# Patient Record
Sex: FEMALE | Race: BLACK OR AFRICAN AMERICAN | NOT HISPANIC OR LATINO | Employment: FULL TIME | URBAN - METROPOLITAN AREA
[De-identification: names, ages, dates, MRNs, and addresses within clinical notes are randomized per-mention and may not be internally consistent; named-entity substitution may affect disease eponyms.]

---

## 2021-04-16 ENCOUNTER — PATIENT MESSAGE (OUTPATIENT)
Dept: RESEARCH | Facility: HOSPITAL | Age: 31
End: 2021-04-16

## 2021-07-01 ENCOUNTER — PATIENT MESSAGE (OUTPATIENT)
Dept: ADMINISTRATIVE | Facility: OTHER | Age: 31
End: 2021-07-01

## 2023-04-19 ENCOUNTER — PATIENT MESSAGE (OUTPATIENT)
Dept: RESEARCH | Facility: HOSPITAL | Age: 33
End: 2023-04-19
Payer: MEDICAID

## 2023-07-28 PROBLEM — Z21 HIV INFECTION: Status: ACTIVE | Noted: 2023-07-28

## 2023-07-28 PROBLEM — R53.1 WEAKNESS: Status: ACTIVE | Noted: 2023-07-28

## 2023-07-28 PROBLEM — B20 HIV INFECTION: Status: ACTIVE | Noted: 2023-07-28

## 2023-07-28 PROBLEM — N30.01 ACUTE CYSTITIS WITH HEMATURIA: Status: ACTIVE | Noted: 2023-07-28

## 2023-10-06 PROBLEM — R53.1 WEAKNESS: Status: RESOLVED | Noted: 2023-07-28 | Resolved: 2023-10-06

## 2023-10-06 PROBLEM — N30.01 ACUTE CYSTITIS WITH HEMATURIA: Status: RESOLVED | Noted: 2023-07-28 | Resolved: 2023-10-06

## 2023-10-06 PROBLEM — R56.9 SEIZURE: Chronic | Status: ACTIVE | Noted: 2023-10-06

## 2023-10-06 PROBLEM — G44.009 HEADACHES, CLUSTER: Chronic | Status: ACTIVE | Noted: 2023-10-06

## 2024-10-08 PROBLEM — E66.811 OBESITY, CLASS I, BMI 30-34.9: Chronic | Status: ACTIVE | Noted: 2024-10-08

## 2025-01-22 ENCOUNTER — PATIENT MESSAGE (OUTPATIENT)
Dept: OBSTETRICS AND GYNECOLOGY | Facility: CLINIC | Age: 35
End: 2025-01-22
Payer: MEDICAID

## 2025-03-06 ENCOUNTER — PATIENT MESSAGE (OUTPATIENT)
Dept: OBSTETRICS AND GYNECOLOGY | Facility: CLINIC | Age: 35
End: 2025-03-06

## 2025-03-06 ENCOUNTER — CLINICAL SUPPORT (OUTPATIENT)
Dept: OBSTETRICS AND GYNECOLOGY | Facility: CLINIC | Age: 35
End: 2025-03-06
Payer: MEDICAID

## 2025-03-06 DIAGNOSIS — N91.2 AMENORRHEA: Primary | ICD-10-CM

## 2025-03-06 PROCEDURE — 99999 PR PBB SHADOW E&M-EST. PATIENT-LVL II: CPT | Mod: PBBFAC,,,

## 2025-03-06 PROCEDURE — 99212 OFFICE O/P EST SF 10 MIN: CPT | Mod: PBBFAC

## 2025-03-06 NOTE — PROGRESS NOTES
Spoke with patient for a total of 20 minutes during virtual visit. Pt logged on late. Updated chart to reflect up to date patient demographics.  Allergies, medications, pharmacy, medical/family history and OB history updated.  Patient was guided through expectations of care during pregnancy.  Pregnancy confirmation & first routine OB appts scheduled. Will reach out to [facility] to get anatomy scan scheduled.   Education provided & questions answered. Encouraged to send message or call office with any questions/concerns. Verbalized understanding.     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

## 2025-03-07 ENCOUNTER — TELEPHONE (OUTPATIENT)
Dept: OBSTETRICS AND GYNECOLOGY | Facility: CLINIC | Age: 35
End: 2025-03-07
Payer: MEDICAID

## 2025-03-08 NOTE — TELEPHONE ENCOUNTER
Received message from Dr Ferro's office. Placed call & spoke with. Rescheduled preg confirm appt with NP at [facility] as requested by staff. Appt at convenient time for pt. Also made sure pt is aware of u/s on 3/14 at 8am. Questions answered. Verbalized understanding.

## 2025-03-14 ENCOUNTER — PROCEDURE VISIT (OUTPATIENT)
Dept: MATERNAL FETAL MEDICINE | Facility: CLINIC | Age: 35
End: 2025-03-14
Payer: MEDICAID

## 2025-03-14 DIAGNOSIS — Z36.2 ENCOUNTER FOR FOLLOW-UP ULTRASOUND OF FETAL ANATOMY: Primary | ICD-10-CM

## 2025-03-14 DIAGNOSIS — N91.2 AMENORRHEA: ICD-10-CM

## 2025-03-14 PROCEDURE — 76811 OB US DETAILED SNGL FETUS: CPT | Mod: PBBFAC | Performed by: OBSTETRICS & GYNECOLOGY

## 2025-03-18 ENCOUNTER — OFFICE VISIT (OUTPATIENT)
Dept: OBSTETRICS AND GYNECOLOGY | Facility: CLINIC | Age: 35
End: 2025-03-18
Payer: MEDICAID

## 2025-03-18 VITALS
BODY MASS INDEX: 40.22 KG/M2 | WEIGHT: 235.56 LBS | SYSTOLIC BLOOD PRESSURE: 116 MMHG | HEART RATE: 89 BPM | HEIGHT: 64 IN | DIASTOLIC BLOOD PRESSURE: 74 MMHG

## 2025-03-18 DIAGNOSIS — N91.2 AMENORRHEA: Primary | ICD-10-CM

## 2025-03-18 DIAGNOSIS — O09.92 HIGH-RISK PREGNANCY IN SECOND TRIMESTER: ICD-10-CM

## 2025-03-18 DIAGNOSIS — O98.712 HIV DISEASE AFFECTING PREGNANCY IN SECOND TRIMESTER: ICD-10-CM

## 2025-03-18 DIAGNOSIS — Z32.01 POSITIVE PREGNANCY TEST: ICD-10-CM

## 2025-03-18 DIAGNOSIS — Z3A.18 18 WEEKS GESTATION OF PREGNANCY: ICD-10-CM

## 2025-03-18 PROCEDURE — 99204 OFFICE O/P NEW MOD 45 MIN: CPT | Mod: TH,S$PBB,,

## 2025-03-18 PROCEDURE — 87077 CULTURE AEROBIC IDENTIFY: CPT

## 2025-03-18 PROCEDURE — 3074F SYST BP LT 130 MM HG: CPT | Mod: CPTII,,,

## 2025-03-18 PROCEDURE — 87186 SC STD MICRODIL/AGAR DIL: CPT

## 2025-03-18 PROCEDURE — 87088 URINE BACTERIA CULTURE: CPT

## 2025-03-18 PROCEDURE — 3078F DIAST BP <80 MM HG: CPT | Mod: CPTII,,,

## 2025-03-18 PROCEDURE — 99213 OFFICE O/P EST LOW 20 MIN: CPT | Mod: PBBFAC,TH,PN

## 2025-03-18 PROCEDURE — 87086 URINE CULTURE/COLONY COUNT: CPT

## 2025-03-18 PROCEDURE — 87591 N.GONORRHOEAE DNA AMP PROB: CPT

## 2025-03-18 PROCEDURE — 1160F RVW MEDS BY RX/DR IN RCRD: CPT | Mod: CPTII,,,

## 2025-03-18 PROCEDURE — 1159F MED LIST DOCD IN RCRD: CPT | Mod: CPTII,,,

## 2025-03-18 PROCEDURE — 99999 PR PBB SHADOW E&M-EST. PATIENT-LVL III: CPT | Mod: PBBFAC,,,

## 2025-03-18 RX ORDER — ONDANSETRON 4 MG/1
4 TABLET, ORALLY DISINTEGRATING ORAL EVERY 8 HOURS PRN
Qty: 30 TABLET | Refills: 0 | Status: SHIPPED | OUTPATIENT
Start: 2025-03-18

## 2025-03-18 NOTE — PROGRESS NOTES
CC: Amenorrhea / Positive Pregnancy Test - Pregnancy Confirmation Visit     HPI:  Yvrose Dye is a 34 y.o. female, ,  No LMP recorded (lmp unknown). Patient is pregnant.  for a routine exam complaining of amenorrhea and positive home UPT. This is the extent of the patient's complaints at this time. Reports she is having nausea and vomiting occasionally when she takes her ART medication and why she has been non-compliant. But daily N/V has improved. Had fetal anatomy scan on 3/14/2025 - need f/u and THAIS changed to 2025. Reports she is taking her prenatal vitamin daily.     Past Medical History:   Diagnosis Date    Human immunodeficiency virus (HIV) disease     Seizures     not had one for 12 years PTA     History reviewed. No pertinent surgical history.    MEDICATIONS AND ALLERGIES:  Current Medications[1]    Review of patient's allergies indicates:  No Known Allergies    No family history on file.    Social History[2]    COMPREHENSIVE GYN HISTORY:  PAP History: Denies abnormal Paps.  Infection History: Denies STDs. Denies PID.  Benign History: Denies uterine fibroids. Denies ovarian cysts. Denies endometriosis. Denies other conditions.  Cancer History: Denies cervical cancer. Denies uterine cancer or hyperplasia. Denies ovarian cancer. Denies vulvar cancer or pre-cancer. Denies vaginal cancer or pre-cancer. Denies breast cancer. Denies colon cancer.  Sexual Activity History: Reports currently being sexually active  Menstrual History: None.  Contraception: None    ROS:  GENERAL: No weight changes. No swelling. No fatigue. No fever.  CARDIOVASCULAR: No chest pain. No shortness of breath. No leg cramps.   NEUROLOGICAL: No headaches. No vision changes.  BREASTS: No pain. No lumps. No discharge.  ABDOMEN: No pain. No nausea. No vomiting. No diarrhea. No constipation.  REPRODUCTIVE: No abnormal bleeding. No pelvic pain  VULVA: No pain. No lesions. No itching.  VAGINA: No relaxation. No itching. No odor.  "No discharge. No lesions.  URINARY: No incontinence. No nocturia. No frequency. No dysuria.    /74 (BP Location: Left arm, Patient Position: Sitting)   Pulse 89   Ht 5' 4" (1.626 m)   Wt 106.8 kg (235 lb 9 oz)   LMP  (LMP Unknown) Comment: pt has nexplanon  Breastfeeding No   BMI 40.43 kg/m²     PE:  AFFECT: Calm, alert and oriented x3. Interactive during exam.  GENERAL: Appears well-nourished, well-developed, in no acute distress.  HEAD: Normocephalic, atraumatic.  THYROID: No thyromegaly.    SKIN: Normal for race, warm, & dry. No lesions or rashes.  LUNGS: Easy and unlabored.  HEART: Regular rate.  EXTREMITIES: No cyanosis, clubbing or edema. No calf tenderness.  LYMPH NODES: No axillary or inguinal adenopathy.      PROCEDURES:  UPT Positive  Fetal anatomy scan done 3/14/25. AGA THAIS updated in [facility]. F/u Anatomy needed and already scheduled with MFM.     DIAGNOSIS:  Gyn exam  IUP with stated LMP of No LMP recorded (lmp unknown). Patient is pregnant.    PLAN:Routine prenatal care. MFM consult for high-risk pregnancy (+HIV w/ art Rx). Connected Mom ordered.    MEDICATIONS PRESCRIBED:  PNV    LABS AND TESTS ORDERED:  New Ob Labs (1st trimester labs)  HIV viral load ordered.  (Wants labs done at [facility] only or at outside facility - not [facility]).    1st TRIMESTER COUNSELING: Discussed all, booklet provided  Common complaints of pregnancy  HIV and other routine prenatal tests including  genetic screening  Risk factors identified by prenatal history  Anticipated course of prenatal care  Nutrition and weight gain counseling  Toxoplasmosis precautions (Cats/Raw Meat)  Sexual activity and exercise  Environmental/Work hazards  Travel  Tobacco (Ask, Advise, Assess, Assist, and Arrange), as well as alcohol and drug use  Use of any medications (Including supplements, Vitamins, Herbs, or OTC Drugs)  Indications for Ultrasound  Domestic violence  Seat belt use  Childbirth classes/Hospital facilities "     TERATOLOGY COUNSELING: Discussed options for SS, MT21 and carrier screening. Pt will let us know her desires. Discussed time constraints on SS.   - Wants Maternit 21 (ordered).    FOLLOW-UP  - Schedule procedure visit later this week for Nexplanon removal   - Maternal fetal medicine (MFM) initial appointment scheduled for (4/2/2025) - high risk pregnancy / + HIV ART Rx  - New Ob Visit in 4 weeks with Dr. Ferro (4/9/2025)  - Follow-up anatomy scan ordered & scheduled for 4/14/2025  - Discussed to call clinic or L&D/ER if after hours for pain/bleeding.      Macy Cope, NP-C [facility]          [1]   Current Outpatient Medications:     dolutegravir-lamivudine (DOVATO)  mg Tab, Take 1 tablet by mouth once daily., Disp: 30 tablet, Rfl: 11    etonogestreL (NEXPLANON) 68 mg Impl subdermal device, 68 mg by Subdermal route once. A single NEXPLANON implant is inserted subdermally just under the skin at the inner side of the non-dominant upper arm., Disp: , Rfl:     ondansetron (ZOFRAN-ODT) 4 MG TbDL, Take 1 tablet (4 mg total) by mouth every 8 (eight) hours as needed., Disp: 30 tablet, Rfl: 0  [2]   Social History  Socioeconomic History    Marital status: Significant Other   Tobacco Use    Smoking status: Never    Smokeless tobacco: Never   Substance and Sexual Activity    Alcohol use: Not Currently    Drug use: Not Currently     Types: Marijuana     Comment: last used 12/2024    Sexual activity: Yes     Partners: Male

## 2025-03-19 ENCOUNTER — RESULTS FOLLOW-UP (OUTPATIENT)
Dept: OBSTETRICS AND GYNECOLOGY | Facility: CLINIC | Age: 35
End: 2025-03-19

## 2025-03-19 LAB
C TRACH DNA SPEC QL NAA+PROBE: NOT DETECTED
N GONORRHOEA DNA SPEC QL NAA+PROBE: NOT DETECTED

## 2025-03-21 LAB — BACTERIA UR CULT: ABNORMAL

## 2025-03-24 ENCOUNTER — ROUTINE PRENATAL (OUTPATIENT)
Dept: OBSTETRICS AND GYNECOLOGY | Facility: CLINIC | Age: 35
End: 2025-03-24
Payer: MEDICAID

## 2025-03-24 VITALS
BODY MASS INDEX: 41.08 KG/M2 | HEART RATE: 77 BPM | SYSTOLIC BLOOD PRESSURE: 114 MMHG | WEIGHT: 239.31 LBS | DIASTOLIC BLOOD PRESSURE: 72 MMHG

## 2025-03-24 DIAGNOSIS — O09.92 HIGH-RISK PREGNANCY IN SECOND TRIMESTER: ICD-10-CM

## 2025-03-24 DIAGNOSIS — R10.2 PELVIC PAIN AFFECTING PREGNANCY IN SECOND TRIMESTER, ANTEPARTUM: ICD-10-CM

## 2025-03-24 DIAGNOSIS — O98.712 HIV DISEASE AFFECTING PREGNANCY IN SECOND TRIMESTER: ICD-10-CM

## 2025-03-24 DIAGNOSIS — O26.892 PELVIC PAIN AFFECTING PREGNANCY IN SECOND TRIMESTER, ANTEPARTUM: ICD-10-CM

## 2025-03-24 DIAGNOSIS — Z3A.19 19 WEEKS GESTATION OF PREGNANCY: Primary | ICD-10-CM

## 2025-03-24 PROCEDURE — 81515 NFCT DS BV&VAGINITIS DNA ALG: CPT

## 2025-03-24 PROCEDURE — 99999 PR PBB SHADOW E&M-EST. PATIENT-LVL II: CPT | Mod: PBBFAC,,,

## 2025-03-24 PROCEDURE — 87086 URINE CULTURE/COLONY COUNT: CPT

## 2025-03-24 PROCEDURE — 99212 OFFICE O/P EST SF 10 MIN: CPT | Mod: PBBFAC,TH,PN

## 2025-03-24 PROCEDURE — 81003 URINALYSIS AUTO W/O SCOPE: CPT

## 2025-03-24 PROCEDURE — 99214 OFFICE O/P EST MOD 30 MIN: CPT | Mod: S$PBB,TH,,

## 2025-03-24 NOTE — PROGRESS NOTES
34 y.o. at 19w1d here for constant pelvic pain (8/10) that feels like pressure and aching in pelvis but also wraps around to lower back bilaterally for last 3 days. Reports HA also but rest helped relieve that pain. Does find activity worsens pelvic pain and sitting or laying down helps for a little while but pressure/pain does return once moving around again. Denies n/v, diarrhea, or being in contact with any who was ill. 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 Has not tried tylenol for pain but has some at home she will try today.   Denies flank pain, fever, N/V, chills, or dysuria.   Denies VB/LOF.   Has not felt baby move just yet during pregnancy.     A/P:  - Increase water intake 6-8 16 ounce water bottles per day.  - UA w/ culture repeated.  - POCT UA WNL  - Vaginosis swab collected.  - Discussed causes of pelvic pain in 2 nd trimester - round ligament pain more common earlier on in subsequent pregnancies.   - Discussed importance of resting when pelvic pain starts and taking tylenol PRN but belly band the best way to help improve round ligament .   - MFM anatomy US on 2025    RTC for Dashawn Ferro on 2025     Macy Cope, NP-C [facility]

## 2025-03-25 LAB
BACTERIA #/AREA URNS AUTO: ABNORMAL /HPF
BACTERIAL VAGINOSIS DNA (OHS): NOT DETECTED
BILIRUB UR QL STRIP.AUTO: NEGATIVE
CANDIDA GLABRATA/KRUSEI DNA (OHS): NOT DETECTED
CANDIDA SPECIES DNA (OHS): NOT DETECTED
CLARITY UR: ABNORMAL
COLOR UR AUTO: YELLOW
GLUCOSE UR QL STRIP: NEGATIVE
HGB UR QL STRIP: ABNORMAL
KETONES UR QL STRIP: NEGATIVE
LEUKOCYTE ESTERASE UR QL STRIP: NEGATIVE
MICROSCOPIC COMMENT: ABNORMAL
NITRITE UR QL STRIP: POSITIVE
PH UR STRIP: 7 [PH]
PROT UR QL STRIP: ABNORMAL
RBC #/AREA URNS AUTO: 8 /HPF (ref 0–4)
SP GR UR STRIP: 1.02
SQUAMOUS #/AREA URNS AUTO: 3 /HPF
TRICHOMONAS VAGINALIS DNA (OHS): NOT DETECTED
UROBILINOGEN UR STRIP-ACNC: NEGATIVE EU/DL
WBC #/AREA URNS AUTO: 3 /HPF (ref 0–5)
YEAST UR QL AUTO: ABNORMAL /HPF

## 2025-03-26 ENCOUNTER — RESULTS FOLLOW-UP (OUTPATIENT)
Dept: OBSTETRICS AND GYNECOLOGY | Facility: CLINIC | Age: 35
End: 2025-03-26

## 2025-03-26 ENCOUNTER — PATIENT MESSAGE (OUTPATIENT)
Dept: OBSTETRICS AND GYNECOLOGY | Facility: CLINIC | Age: 35
End: 2025-03-26
Payer: MEDICAID

## 2025-03-26 DIAGNOSIS — O23.42 UTI (URINARY TRACT INFECTION) DURING PREGNANCY, SECOND TRIMESTER: Primary | ICD-10-CM

## 2025-03-26 RX ORDER — AMOXICILLIN AND CLAVULANATE POTASSIUM 875; 125 MG/1; MG/1
1 TABLET, FILM COATED ORAL EVERY 12 HOURS
Qty: 14 TABLET | Refills: 0 | Status: SHIPPED | OUTPATIENT
Start: 2025-03-26 | End: 2025-04-02

## 2025-03-28 LAB — BACTERIA UR CULT: ABNORMAL

## 2025-03-30 ENCOUNTER — PATIENT MESSAGE (OUTPATIENT)
Dept: OTHER | Facility: OTHER | Age: 35
End: 2025-03-30
Payer: MEDICAID

## 2025-04-01 PROBLEM — O98.712 HIV DISEASE AFFECTING PREGNANCY IN SECOND TRIMESTER: Status: ACTIVE | Noted: 2025-04-01

## 2025-04-01 NOTE — ASSESSMENT & PLAN NOTE
Unfortunately today's counseling was somewhat limited due to inability to review any updated labs. In my discussion with Yvrose, she believes her dx was made around the end of 2024, though record suggest it was in 2023 during an ED visit prompted by generally feeling poorly. Available labs suggest HIV positive by rapid testing but I do not see any prior quantitative RNA or CD4 count. She does appear to have been started on HAART in the form of Dovato. When discussing compliance, she shares that she misses about 3 doses a week. Appears to have been referred to ID by primary care provider but I do not see that she has made it to a visit and she does not recall.     I counseled the patient regarding the risks of vertical transmission and encouraged compliance with combination antiretroviral therapy (cART) during pregnancy. We discussed the risks of vertical transmission as relates to patient's compliance with cART and viral load. We defer selection and continuation of cART therapy to the patient's infectious disease specialist as genotyping and baseline laboratory studies should be assessed prior to initiation. The site: https:hivinfo.nih.gov lists the up to date recommendations regarding efficacy and potential risks of medications in pregnancy.     Patient's regimen is Dovato: which is associated with an increased risk of congenital malformations. Dolutegravir as a component of this combined antiretroviral therapy has been associated with increased risk of NTDs.    Recommendations:  Referral to ID was placed today; defer baseline laboratory evaluation, prophylaxis for opportunistic infections, genotyping, and cART regimen to ID  Discussed importance of updated labs. Added a CD4 count to the labs already ordered and pending for her and sent her straight to lab following today's visit. Unfortunately as it is now end of day, it does not appear that she has had them drawn.   Ensure vaccinations are up to date: influenza,  hepatitis A, hepatitis B, pneumovax, and TDAP. If COVID vaccine deemed appropriate by ID, offer as able  Continue current regimen: Dovato - of note, there are some medications with better safety profiles in pregnancy (Efavirenz, Nevirapine, Tenofovir). No US was performed today but review of prior anatomic survey from 3/14, it should be noted that the spinal views could not be cleared and thus an NTD cannot be ruled out. At this gestational age, any potential effects on organogenesis would have already occurred. Follow up scan has been scheduled for 4/14 to complete anatomic survey.   Stressed importance of compliance for both decreasing risk of vertical transmission and decreasing risk of resistance in the strain   Health maintenance: pap smear, TB screen, and STI testing per ID or OB; repeat STI testing in 3rd trimester  Amniocentesis has previously been avoided in the past. If this is considered, it would be recommended to at least have an undetectable viral load before pursuing this.  Consider early GDM screening if patient on protease inhibitor and repeat at 24-28 weeks  Fetal growth ultrasound at 28 and 34 weeks to monitor for fetal growth restriction  Viral load (VL) monitoring:  Monitor monthly until undetectable  If VL undetectable, monitor every trimester, at 36 weeks, and upon admission to L&D  Monitor CD4 count every trimester if < 300  Delivery:  If VL is > 1000 copies/mL prior to 38 weeks', perform pre-labor C/S at 38 0/7 wga to reduce risk of vertical transmission - discussed this as a possibility. She has notably had 3 prior vaginal deliveries with pelvis proven to at least 7#. Encouraged compliance from standpoint of attempt to avoid major abdominal surgery as well.   If VL is <= 1000 copies/mL and patient is on cART, C/S is typically reserved for obstetric indications only and delivery can take place at or beyond 39 weeks gestation (unless an indication arises earlier)  Avoid use of fetal scalp  electrode  AROM and operative vaginal delivery per obstetric indication, but avoid if possible if viral load >= 50 copies/mL  Continue cART regimen during L&D. [facility] recommends administration of intrapartum Zidovudine (ZDV) to all patients with HIV prior to  or during vaginal delivery, regardless of VL. Goal is to administer at least 3 hours of ZDV prior to delivery. Dose is 1-hour loading dose at 2 mg/kg followed by continuous infusion of 1 mg/kg/hr until delivery.  Avoid methergine with certain protease inhibitors.  Notify pediatrics so that appropriate postexposure prophylaxis and HIV testing is performed.  Patient counseled re: condom use; partner should be evaluated for PrEP.  Postpartum care should include contraceptive counseling (LARC should be considered), continued compliance with ID & cART, and monitoring for peripartum mood disorders. Patient should not breastfeed.  If patient has a viral load of >1000 copies and presents with ROM or labor, you may call [phone] for HIV expert to help develop an individualized delivery plan.

## 2025-04-02 ENCOUNTER — OFFICE VISIT (OUTPATIENT)
Dept: MATERNAL FETAL MEDICINE | Facility: CLINIC | Age: 35
End: 2025-04-02
Payer: MEDICAID

## 2025-04-02 VITALS
SYSTOLIC BLOOD PRESSURE: 117 MMHG | HEIGHT: 64 IN | DIASTOLIC BLOOD PRESSURE: 72 MMHG | BODY MASS INDEX: 40.98 KG/M2 | WEIGHT: 240.06 LBS

## 2025-04-02 DIAGNOSIS — O98.712 HIV DISEASE AFFECTING PREGNANCY IN SECOND TRIMESTER: Primary | ICD-10-CM

## 2025-04-02 PROCEDURE — 99213 OFFICE O/P EST LOW 20 MIN: CPT | Mod: PBBFAC,TH | Performed by: STUDENT IN AN ORGANIZED HEALTH CARE EDUCATION/TRAINING PROGRAM

## 2025-04-02 PROCEDURE — 99999 PR PBB SHADOW E&M-EST. PATIENT-LVL III: CPT | Mod: PBBFAC,,, | Performed by: STUDENT IN AN ORGANIZED HEALTH CARE EDUCATION/TRAINING PROGRAM

## 2025-04-02 NOTE — PROGRESS NOTES
MATERNAL-FETAL MEDICINE   CONSULT NOTE    Provider requesting consultation: Dr. Ferro    SUBJECTIVE:     Ms. Yvrose Dye is a 35 y.o.  female with IUP at 20w3d who is seen in consultation by MFM for evaluation and management of:  Problem   HIV Disease Affecting Pregnancy in Second Trimester         Medication List with Changes/Refills   Current Medications    AMOXICILLIN-CLAVULANATE 875-125MG (AUGMENTIN) 875-125 MG PER TABLET    Take 1 tablet by mouth every 12 (twelve) hours. for 7 days    DOLUTEGRAVIR-LAMIVUDINE (DOVATO)  MG TAB    Take 1 tablet by mouth once daily.    ETONOGESTREL (NEXPLANON) 68 MG IMPL SUBDERMAL DEVICE    68 mg by Subdermal route once. A single NEXPLANON implant is inserted subdermally just under the skin at the inner side of the non-dominant upper arm.    ONDANSETRON (ZOFRAN-ODT) 4 MG TBDL    Take 1 tablet (4 mg total) by mouth every 8 (eight) hours as needed.       Review of patient's allergies indicates:  No Known Allergies    PMH:  Past Medical History:   Diagnosis Date    Human immunodeficiency virus (HIV) disease     Seizures     not had one for 12 years PTA       PObHx:  OB History    Para Term  AB Living   4 3 3   3   SAB IAB Ectopic Multiple Live Births      1 3      # Outcome Date GA Lbr Aguila/2nd Weight Sex Type Anes PTL Lv   4A             4B Current            3 Term 07/02/15 40w0d  3.402 kg (7 lb 8 oz) F Vag-Spont  N NAHUM   2 Term 14 40w0d  2.863 kg (6 lb 5 oz) M Vag-Spont  N NAHUM   1 Term 06/15/10 40w0d  3.317 kg (7 lb 5 oz) F Vag-Spont  N NAHUM       PSH:History reviewed. No pertinent surgical history.    Family history:family history is not on file.    Social history: reports that she has never smoked. She has never used smokeless tobacco. She reports that she does not currently use alcohol. She reports that she does not currently use drugs after having used the following drugs: Marijuana.    Genetic history:  The patient denies any  "inherited genetic diseases or birth defects in herself or her partner's personal history or family.    Objective:   /72 (BP Location: Left arm, Patient Position: Sitting)   Ht 5' 4" (1.626 m)   Wt 108.9 kg (240 lb 1.3 oz)   LMP  (LMP Unknown) Comment: pt has nexplanon  BMI 41.21 kg/m²     Physical Exam  WNWD female appearing stated age, in NAD  No conversational dyspnea  Gravid abdomen  FHTs +    Significant labs/imaging:  No recent labs to review  Reviewed HIV + testing from , no available CD4 count    ASSESSMENT/PLAN:     35 y.o.  female with IUP at 20w3d     HIV disease affecting pregnancy in second trimester  Unfortunately today's counseling was somewhat limited due to inability to review any updated labs. In my discussion with Yvrose, she believes her dx was made around the end of , though record suggest it was in  during an ED visit prompted by generally feeling poorly. Available labs suggest HIV positive by rapid testing but I do not see any prior quantitative RNA or CD4 count. She does appear to have been started on HAART in the form of Dovato. When discussing compliance, she shares that she misses about 3 doses a week. Appears to have been referred to ID by primary care provider but I do not see that she has made it to a visit and she does not recall.     I counseled the patient regarding the risks of vertical transmission and encouraged compliance with combination antiretroviral therapy (cART) during pregnancy. We discussed the risks of vertical transmission as relates to patient's compliance with cART and viral load. We defer selection and continuation of cART therapy to the patient's infectious disease specialist as genotyping and baseline laboratory studies should be assessed prior to initiation. The site: https:hivinfo.nih.gov lists the up to date recommendations regarding efficacy and potential risks of medications in pregnancy.     Patient's regimen is Dovato: which is " associated with an increased risk of congenital malformations. Dolutegravir as a component of this combined antiretroviral therapy has been associated with increased risk of NTDs.    Recommendations:  Referral to ID was placed today; defer baseline laboratory evaluation, prophylaxis for opportunistic infections, genotyping, and cART regimen to ID  Discussed importance of updated labs. Added a CD4 count to the labs already ordered and pending for her and sent her straight to lab following today's visit. Unfortunately as it is now end of day, it does not appear that she has had them drawn.   Ensure vaccinations are up to date: influenza, hepatitis A, hepatitis B, pneumovax, and TDAP. If COVID vaccine deemed appropriate by ID, offer as able  Continue current regimen: Dovato - of note, there are some medications with better safety profiles in pregnancy (Efavirenz, Nevirapine, Tenofovir). No US was performed today but review of prior anatomic survey from 3/14, it should be noted that the spinal views could not be cleared and thus an NTD cannot be ruled out. At this gestational age, any potential effects on organogenesis would have already occurred. Follow up scan has been scheduled for 4/14 to complete anatomic survey.   Stressed importance of compliance for both decreasing risk of vertical transmission and decreasing risk of resistance in the strain   Health maintenance: pap smear, TB screen, and STI testing per ID or OB; repeat STI testing in 3rd trimester  Amniocentesis has previously been avoided in the past. If this is considered, it would be recommended to at least have an undetectable viral load before pursuing this.  Consider early GDM screening if patient on protease inhibitor and repeat at 24-28 weeks  Fetal growth ultrasound at 28 and 34 weeks to monitor for fetal growth restriction  Viral load (VL) monitoring:  Monitor monthly until undetectable  If VL undetectable, monitor every trimester, at 36 weeks, and  upon admission to L&D  Monitor CD4 count every trimester if < 300  Delivery:  If VL is > 1000 copies/mL prior to 38 weeks', perform pre-labor C/S at 38 0/7 wga to reduce risk of vertical transmission - discussed this as a possibility. She has notably had 3 prior vaginal deliveries with pelvis proven to at least 7#. Encouraged compliance from standpoint of attempt to avoid major abdominal surgery as well.   If VL is <= 1000 copies/mL and patient is on cART, C/S is typically reserved for obstetric indications only and delivery can take place at or beyond 39 weeks gestation (unless an indication arises earlier)  Avoid use of fetal scalp electrode  AROM and operative vaginal delivery per obstetric indication, but avoid if possible if viral load >= 50 copies/mL  Continue cART regimen during L&D. [facility] recommends administration of intrapartum Zidovudine (ZDV) to all patients with HIV prior to  or during vaginal delivery, regardless of VL. Goal is to administer at least 3 hours of ZDV prior to delivery. Dose is 1-hour loading dose at 2 mg/kg followed by continuous infusion of 1 mg/kg/hr until delivery.  Avoid methergine with certain protease inhibitors.  Notify pediatrics so that appropriate postexposure prophylaxis and HIV testing is performed.  Patient counseled re: condom use; partner should be evaluated for PrEP.  Postpartum care should include contraceptive counseling (LARC should be considered), continued compliance with ID & cART, and monitoring for peripartum mood disorders. Patient should not breastfeed.  If patient has a viral load of >1000 copies and presents with ROM or labor, you may call [phone] for HIV expert to help develop an individualized delivery plan.        FOLLOW UP:   F/u on  as previously scheduled for US and MFM consultation.       60 minutes of total time spent on the encounter, which includes face to face time and non-face to face time preparing to see the patient (eg,  review of tests), obtaining and/or reviewing separately obtained history, documenting clinical information in the electronic or other health record, independently interpreting results (not separately reported) and communicating results to the patient/family/caregiver, or care coordination (not separately reported).      Bronwyn Mei  Maternal-Fetal Medicine    Electronically Signed by Bronwyn Mei April 2, 2025

## 2025-04-09 ENCOUNTER — INITIAL PRENATAL (OUTPATIENT)
Dept: OBSTETRICS AND GYNECOLOGY | Facility: CLINIC | Age: 35
End: 2025-04-09
Payer: MEDICAID

## 2025-04-09 VITALS
BODY MASS INDEX: 41.17 KG/M2 | SYSTOLIC BLOOD PRESSURE: 115 MMHG | DIASTOLIC BLOOD PRESSURE: 69 MMHG | WEIGHT: 239.88 LBS

## 2025-04-09 DIAGNOSIS — Z30.46 NEXPLANON REMOVAL: ICD-10-CM

## 2025-04-09 DIAGNOSIS — O98.712 HIV DISEASE AFFECTING PREGNANCY IN SECOND TRIMESTER: ICD-10-CM

## 2025-04-09 DIAGNOSIS — O09.90 HIGH RISK PREGNANCY, ANTEPARTUM: Primary | ICD-10-CM

## 2025-04-09 PROCEDURE — 99999 PR PBB SHADOW E&M-EST. PATIENT-LVL II: CPT | Mod: PBBFAC,,, | Performed by: OBSTETRICS & GYNECOLOGY

## 2025-04-09 PROCEDURE — 99212 OFFICE O/P EST SF 10 MIN: CPT | Mod: PBBFAC,TH,PN | Performed by: OBSTETRICS & GYNECOLOGY

## 2025-04-09 PROCEDURE — 99214 OFFICE O/P EST MOD 30 MIN: CPT | Mod: 25,S$PBB,TH, | Performed by: OBSTETRICS & GYNECOLOGY

## 2025-04-09 PROCEDURE — 11982 REMOVE DRUG IMPLANT DEVICE: CPT | Mod: PBBFAC,PN | Performed by: OBSTETRICS & GYNECOLOGY

## 2025-04-09 NOTE — PROGRESS NOTES
34yo  at 21w3d presents for initial OB visit. HR OB due to HIV, on ART but misses doses occasionally - she does report she had been better about this.  Has not had labs drawn for quant and CD4 count.  Desires blood work to be drawn at [facility] - has US appt next week and states she will draw labs then (with new ob labs as well).  Overall doing well today, occ ligament pain, but no bleeding/spotting. +FM.  Taking PNV.   Of note, Nexplanon in place for 9 years.       A/P:  HR OB visit today.   Nexplanon removed today - see procedure note.   Discussed importance of labwork, including viral load and CD 4 count - will draw when at [facility] for US next week.   Continue f/u with MFM  Discussed importance of routine prenatal care.  OB ED precautions given.    Counseling done, precautions given, all questions answered.  RTC 3 wks for OB visit and glucola (will schedule at [facility] per pt request).     Shaila Ferro MD

## 2025-04-09 NOTE — PROCEDURES
Removal of Nexplanon Device    Date/Time: 4/9/2025 10:30 AM    Performed by: Shaila Ferro MD  Authorized by: Shaila Ferro MD    Consent obtained:  Prior to procedure the appropriate consent was completed and verified  Consent given by:  Patient  Procedure risks and benefits discussed: yes    Patient questions answered: yes    Patient agrees, verbalizes understanding, and wants to proceed: yes    Implant grasped by: hemostat  Removed with no complications: yes    Removal due to expiration: yes    Arm: left arm  Palpation confirms location: yes  Small stab incision was made in arm: yes  Upon removal device was intact: yes  Site was close with steri-strips and pressure bandage applied: yes  Prepped with:  povidone-iodine 7.5% surgical scrub  Local anesthetic:  Lidocaine 1%   The site was cleaned  and prepped in a sterile fashion: yes  Specimen sent to pathology: Yes

## 2025-04-14 ENCOUNTER — LAB VISIT (OUTPATIENT)
Dept: LAB | Facility: OTHER | Age: 35
End: 2025-04-14
Attending: NURSE PRACTITIONER
Payer: MEDICAID

## 2025-04-14 ENCOUNTER — PROCEDURE VISIT (OUTPATIENT)
Dept: MATERNAL FETAL MEDICINE | Facility: CLINIC | Age: 35
End: 2025-04-14
Payer: MEDICAID

## 2025-04-14 ENCOUNTER — TELEPHONE (OUTPATIENT)
Dept: MATERNAL FETAL MEDICINE | Facility: CLINIC | Age: 35
End: 2025-04-14
Payer: MEDICAID

## 2025-04-14 ENCOUNTER — OFFICE VISIT (OUTPATIENT)
Dept: MATERNAL FETAL MEDICINE | Facility: CLINIC | Age: 35
End: 2025-04-14
Attending: OBSTETRICS & GYNECOLOGY
Payer: MEDICAID

## 2025-04-14 VITALS
DIASTOLIC BLOOD PRESSURE: 85 MMHG | BODY MASS INDEX: 41.1 KG/M2 | HEIGHT: 64 IN | WEIGHT: 240.75 LBS | SYSTOLIC BLOOD PRESSURE: 135 MMHG

## 2025-04-14 DIAGNOSIS — O98.712 HIV DISEASE AFFECTING PREGNANCY IN SECOND TRIMESTER: ICD-10-CM

## 2025-04-14 DIAGNOSIS — Z36.89 ENCOUNTER FOR ULTRASOUND TO ASSESS FETAL GROWTH: ICD-10-CM

## 2025-04-14 DIAGNOSIS — Z36.2 ENCOUNTER FOR FOLLOW-UP ULTRASOUND OF FETAL ANATOMY: ICD-10-CM

## 2025-04-14 DIAGNOSIS — E66.811 OBESITY, CLASS I, BMI 30-34.9: Primary | Chronic | ICD-10-CM

## 2025-04-14 DIAGNOSIS — O09.90 HIGH RISK PREGNANCY, ANTEPARTUM: ICD-10-CM

## 2025-04-14 LAB
ABSOLUTE EOSINOPHIL (OHS): 0.04 K/UL
ABSOLUTE MONOCYTE (OHS): 0.48 K/UL (ref 0.3–1)
ABSOLUTE NEUTROPHIL COUNT (OHS): 3.74 K/UL (ref 1.8–7.7)
ALBUMIN SERPL BCP-MCNC: 2.9 G/DL (ref 3.5–5.2)
ALP SERPL-CCNC: 50 UNIT/L (ref 40–150)
ALT SERPL W/O P-5'-P-CCNC: 7 UNIT/L (ref 10–44)
ANION GAP (OHS): 6 MMOL/L (ref 8–16)
AST SERPL-CCNC: 12 UNIT/L (ref 11–45)
BASOPHILS # BLD AUTO: 0.01 K/UL
BASOPHILS NFR BLD AUTO: 0.2 %
BILIRUB SERPL-MCNC: 0.2 MG/DL (ref 0.1–1)
BUN SERPL-MCNC: 5 MG/DL (ref 6–20)
CALCIUM SERPL-MCNC: 8.6 MG/DL (ref 8.7–10.5)
CHLORIDE SERPL-SCNC: 110 MMOL/L (ref 95–110)
CO2 SERPL-SCNC: 20 MMOL/L (ref 23–29)
CREAT SERPL-MCNC: 0.6 MG/DL (ref 0.5–1.4)
ERYTHROCYTE [DISTWIDTH] IN BLOOD BY AUTOMATED COUNT: 15.4 % (ref 11.5–14.5)
GFR SERPLBLD CREATININE-BSD FMLA CKD-EPI: >60 ML/MIN/1.73/M2
GLUCOSE SERPL-MCNC: 88 MG/DL (ref 70–110)
HBV SURFACE AG SERPL QL IA: NORMAL
HCT VFR BLD AUTO: 28.7 % (ref 37–48.5)
HCV AB SERPL QL IA: NEGATIVE
HGB BLD-MCNC: 9 GM/DL (ref 12–16)
IMM GRANULOCYTES # BLD AUTO: 0.01 K/UL (ref 0–0.04)
IMM GRANULOCYTES NFR BLD AUTO: 0.2 % (ref 0–0.5)
INDIRECT COOMBS: NORMAL
LYMPHOCYTES # BLD AUTO: 1.09 K/UL (ref 1–4.8)
MCH RBC QN AUTO: 27 PG (ref 27–31)
MCHC RBC AUTO-ENTMCNC: 31.4 G/DL (ref 32–36)
MCV RBC AUTO: 86 FL (ref 82–98)
NUCLEATED RBC (/100WBC) (OHS): 0 /100 WBC
PLATELET # BLD AUTO: 225 K/UL (ref 150–450)
PMV BLD AUTO: 10.6 FL (ref 9.2–12.9)
POTASSIUM SERPL-SCNC: 3.7 MMOL/L (ref 3.5–5.1)
PROT SERPL-MCNC: 6.8 GM/DL (ref 6–8.4)
RBC # BLD AUTO: 3.33 M/UL (ref 4–5.4)
RELATIVE EOSINOPHIL (OHS): 0.7 %
RELATIVE LYMPHOCYTE (OHS): 20.3 % (ref 18–48)
RELATIVE MONOCYTE (OHS): 8.9 % (ref 4–15)
RELATIVE NEUTROPHIL (OHS): 69.7 % (ref 38–73)
RH BLD: NORMAL
SODIUM SERPL-SCNC: 136 MMOL/L (ref 136–145)
SPECIMEN OUTDATE: NORMAL
T PALLIDUM IGG+IGM SER QL: NEGATIVE
WBC # BLD AUTO: 5.37 K/UL (ref 3.9–12.7)

## 2025-04-14 PROCEDURE — 99213 OFFICE O/P EST LOW 20 MIN: CPT | Mod: S$PBB,TH,, | Performed by: OBSTETRICS & GYNECOLOGY

## 2025-04-14 PROCEDURE — 3079F DIAST BP 80-89 MM HG: CPT | Mod: CPTII,,, | Performed by: OBSTETRICS & GYNECOLOGY

## 2025-04-14 PROCEDURE — 3008F BODY MASS INDEX DOCD: CPT | Mod: CPTII,,, | Performed by: OBSTETRICS & GYNECOLOGY

## 2025-04-14 PROCEDURE — 99999 PR PBB SHADOW E&M-EST. PATIENT-LVL II: CPT | Mod: PBBFAC,,, | Performed by: OBSTETRICS & GYNECOLOGY

## 2025-04-14 PROCEDURE — 86361 T CELL ABSOLUTE COUNT: CPT

## 2025-04-14 PROCEDURE — 82040 ASSAY OF SERUM ALBUMIN: CPT

## 2025-04-14 PROCEDURE — 87340 HEPATITIS B SURFACE AG IA: CPT

## 2025-04-14 PROCEDURE — 85025 COMPLETE CBC W/AUTO DIFF WBC: CPT

## 2025-04-14 PROCEDURE — 87536 HIV-1 QUANT&REVRSE TRNSCRPJ: CPT

## 2025-04-14 PROCEDURE — 1160F RVW MEDS BY RX/DR IN RCRD: CPT | Mod: CPTII,,, | Performed by: OBSTETRICS & GYNECOLOGY

## 2025-04-14 PROCEDURE — 86850 RBC ANTIBODY SCREEN: CPT | Performed by: OBSTETRICS & GYNECOLOGY

## 2025-04-14 PROCEDURE — 1159F MED LIST DOCD IN RCRD: CPT | Mod: CPTII,,, | Performed by: OBSTETRICS & GYNECOLOGY

## 2025-04-14 PROCEDURE — 3075F SYST BP GE 130 - 139MM HG: CPT | Mod: CPTII,,, | Performed by: OBSTETRICS & GYNECOLOGY

## 2025-04-14 PROCEDURE — 36415 COLL VENOUS BLD VENIPUNCTURE: CPT

## 2025-04-14 PROCEDURE — 99212 OFFICE O/P EST SF 10 MIN: CPT | Mod: PBBFAC,TH | Performed by: OBSTETRICS & GYNECOLOGY

## 2025-04-14 PROCEDURE — 86762 RUBELLA ANTIBODY: CPT

## 2025-04-14 PROCEDURE — 86593 SYPHILIS TEST NON-TREP QUANT: CPT

## 2025-04-14 PROCEDURE — 86803 HEPATITIS C AB TEST: CPT

## 2025-04-14 NOTE — PROGRESS NOTES
"Maternal Fetal Medicine follow up consult    SUBJECTIVE:     Yvrose Dye is a 35 y.o.  female with IUP at 22w1d who is seen in follow up consultation by [facility].  Pregnancy complications include:   HIV Disease Affecting Pregnancy in Second Trimester     Previous notes reviewed.   No changes to medical, surgical, family, social, or obstetric history.    Interval history since last M visit: No new issues.  Labs obtained this AM    Medications reviewed.    Care team members:  Dr. Ferro - Primary OB       OBJECTIVE:   /85 (BP Location: Left arm, Patient Position: Sitting)   Ht 5' 4" (1.626 m)   Wt 109.2 kg (240 lb 11.9 oz)   LMP  (LMP Unknown) Comment: pt has nexplanon  BMI 41.32 kg/m²     Ultrasound performed. See viewpoint for full ultrasound report.  The fetal anatomic survey was completed today, and no fetal structural abnormalities were identified of the structures imaged today.   Fetal size is AGA with the EFW at the 56% and the AC at the 70%. The EFW is 547 g.  AFV is normal.     Significant labs/imaging:  Pending viral load CD4    ASSESSMENT/PLAN:     35 y.o.  female with IUP at 22w1d    The patient was given an opportunity to ask questions about management and the diease process.  She expressed an understanding of and agreement to the above impression and plan. All questions were answered to her satisfaction.  She was given contact information to the [facility] clinic to address further concerns.        "

## 2025-04-14 NOTE — TELEPHONE ENCOUNTER
After asking our staff if they called her.  I called patient and told her no one from our office called her.  Patient verbalized her understanding.    ----- Message from Thelma sent at 4/14/2025 12:35 PM CDT -----  Regarding: Return Call  Contact: Patient  Type:  Needs Medical AdviceWho Called: Patient Symptoms (please be specific): n/a  How long has patient had these symptoms:   n/a Pharmacy name and phone #:   n/a Would the patient rather a call back or a response via [facility]?  Call back Best Call Back Number:  [phone] Information: Patient stated she was returning a missed call from [facility] no notes were left in chart but patient was seen by dept on 04/14/2025 Please Assist

## 2025-04-15 LAB
CD3+CD4+ CELLS # SPEC: 363 CELLS/UL (ref 300–1400)
CD3+CD4+ CELLS NFR BLD: 29.21 % (ref 28–57)
HIV1 RNA # SERPL NAA+PROBE: <20 COPIES/ML
HIV1 RNA SERPL NAA+PROBE-LOG#: <1.3 LOG COPIES/ML
HIV1 RNA SERPL NAA+PROBE-LOG#: DETECTED {LOG_COPIES}/ML
LABORATORY COMMENT REPORT: NORMAL
RUBV IGG SER-ACNC: 16.4 IU/ML
RUBV IGG SER-IMP: REACTIVE

## 2025-04-17 ENCOUNTER — TELEPHONE (OUTPATIENT)
Dept: OBSTETRICS AND GYNECOLOGY | Facility: CLINIC | Age: 35
End: 2025-04-17
Payer: MEDICAID

## 2025-04-17 NOTE — TELEPHONE ENCOUNTER
Patient asked about lab results advise  is still in clinic and ill send message over to her for results     Copied from CRM #[id number]. Topic: General Inquiry - Test Results  >> Apr 17, 2025 10:20 AM Med Assistant Capri wrote:  CALLED PATIENT REGARDING CALL BACK SHE WAS REQUESTING FOR RESULT PATIENT STAT

## 2025-04-27 ENCOUNTER — PATIENT MESSAGE (OUTPATIENT)
Dept: OTHER | Facility: OTHER | Age: 35
End: 2025-04-27
Payer: MEDICAID

## 2025-05-11 ENCOUNTER — PATIENT MESSAGE (OUTPATIENT)
Dept: OTHER | Facility: OTHER | Age: 35
End: 2025-05-11
Payer: MEDICAID

## 2025-05-19 ENCOUNTER — TELEPHONE (OUTPATIENT)
Dept: MATERNAL FETAL MEDICINE | Facility: CLINIC | Age: 35
End: 2025-05-19
Payer: MEDICAID

## 2025-05-19 ENCOUNTER — NURSE TRIAGE (OUTPATIENT)
Dept: ADMINISTRATIVE | Facility: CLINIC | Age: 35
End: 2025-05-19
Payer: MEDICAID

## 2025-05-19 NOTE — TELEPHONE ENCOUNTER
----- Message from Maria Elena sent at 5/19/2025 10:14 AM CDT -----  Contact: Patient, [phone]  .[email or web address] Patient is calling for Medical Advice regarding: Calling due to 6 months pregnant, abdominal pain, can not  feel baby moving. On Call Nurse line is busy. Symptom: Abdominal Pain During Pregnancy - Over 20 WeeksOutcome: Transfer to a nurse or provider NOW!Reason: Severe headacheHow long has patient had these symptoms:Pharmacy name and phone#:Patient wants a call back or thru [facility]:Comments:Please advise patient replies from provider may take up to 48 hours.

## 2025-05-19 NOTE — TELEPHONE ENCOUNTER
Pt reports she is having abdominal pain and a HA. Pt reports she spoke with her doctor's office and was told to go to [facility] so she is on the way there now. Care advice no contact.   Reason for Disposition   Patient already left for the hospital/clinic    Protocols used: No Contact or Duplicate Contact Call-A-OH

## 2025-05-19 NOTE — TELEPHONE ENCOUNTER
Returned pt call stated that she is having abdominal pain and that she has never felt the baby move  pt advised to go to [facility] for eval   pt verbalized understanding

## 2025-05-20 ENCOUNTER — HOSPITAL ENCOUNTER (EMERGENCY)
Facility: OTHER | Age: 35
Discharge: HOME | End: 2025-05-20
Attending: STUDENT IN AN ORGANIZED HEALTH CARE EDUCATION/TRAINING PROGRAM
Payer: MEDICAID

## 2025-05-20 VITALS
OXYGEN SATURATION: 100 % | DIASTOLIC BLOOD PRESSURE: 57 MMHG | HEART RATE: 72 BPM | RESPIRATION RATE: 20 BRPM | TEMPERATURE: 98 F | SYSTOLIC BLOOD PRESSURE: 116 MMHG

## 2025-05-20 DIAGNOSIS — Z3A.27 27 WEEKS GESTATION OF PREGNANCY: ICD-10-CM

## 2025-05-20 DIAGNOSIS — O36.8120 DECREASED FETAL MOVEMENTS IN SECOND TRIMESTER, SINGLE OR UNSPECIFIED FETUS: Primary | ICD-10-CM

## 2025-05-20 PROCEDURE — 99284 EMERGENCY DEPT VISIT MOD MDM: CPT | Mod: 25

## 2025-05-20 PROCEDURE — 76815 OB US LIMITED FETUS(S): CPT | Mod: 26,,, | Performed by: STUDENT IN AN ORGANIZED HEALTH CARE EDUCATION/TRAINING PROGRAM

## 2025-05-20 PROCEDURE — 25000003 PHARM REV CODE 250

## 2025-05-20 PROCEDURE — 59025 FETAL NON-STRESS TEST: CPT

## 2025-05-20 PROCEDURE — 99284 EMERGENCY DEPT VISIT MOD MDM: CPT | Mod: 25,,, | Performed by: STUDENT IN AN ORGANIZED HEALTH CARE EDUCATION/TRAINING PROGRAM

## 2025-05-20 PROCEDURE — 59025 FETAL NON-STRESS TEST: CPT | Mod: 26,59,, | Performed by: STUDENT IN AN ORGANIZED HEALTH CARE EDUCATION/TRAINING PROGRAM

## 2025-05-20 RX ORDER — ACETAMINOPHEN 500 MG
1000 TABLET ORAL ONCE
Status: COMPLETED | OUTPATIENT
Start: 2025-05-20 | End: 2025-05-20

## 2025-05-20 RX ORDER — FAMOTIDINE 20 MG/1
20 TABLET, FILM COATED ORAL DAILY
Status: COMPLETED | OUTPATIENT
Start: 2025-05-20 | End: 2025-05-20

## 2025-05-20 RX ADMIN — ACETAMINOPHEN 1000 MG: 500 TABLET, FILM COATED ORAL at 06:05

## 2025-05-20 RX ADMIN — FAMOTIDINE 20 MG: 20 TABLET, FILM COATED ORAL at 06:05

## 2025-05-20 NOTE — DISCHARGE INSTRUCTIONS
Labor Precautions  Return if you experience contractions, uterine tightening or cramps(more than 4 in 1 hour for 2 consecutive hours)  Backache that comes and goes  Pelvic pressure  Change in vaginal discharge  Vaginal Bleeding  Leaking of fluid  Call the OB [phone]) during regular business hours or L&D([phone]) after hours for any questions or concerns  Keep previously scheduled clinic appointment  Drink 8-10 glasses of water a day

## 2025-05-20 NOTE — ED PROVIDER NOTES
"Encounter Date: 2025       History     Chief Complaint   Patient presents with    Abdominal Pain     Pain in upper abdomen "Coming & going", 8/10.     Headache    visual change     Reports floaters in vision last night     Decreased Fetal Movement     Pt states she has not felt any fetal movement at all throughout this pregnancy      Yvrose Dye is a 35 y.o.  at 27w2d who presents to the OB ED today (2025) with CC of abdominal pain and decreased fetal movements.    Patient notes upper abdominal pain that is constant and started spontaneously yesterday while she was at work. She is unsure if these are contractions. She notes the pain is not similar to epigastric/GERD-like pain. No diarrhea, constipation, nausea, vomiting, dysuria or hematuria.     She reports occasional vaginal spotting. No spotting at the moment. No bleeding, no leakage of fluid. Patient is unsure if she is having contractions. She reports does not feel fetal movement yet in this pregnancy.     This pregnancy is complicated by hx of HIV, on anti-retroviral. Follows with Dr. Ferro.           Review of patient's allergies indicates:  No Known Allergies  Past Medical History:   Diagnosis Date    Human immunodeficiency virus (HIV) disease     Seizures     not had one for 12 years PTA     No past surgical history on file.  No family history on file.  Social History[1]  Review of Systems   Constitutional:  Negative for fever.   HENT:  Negative for sore throat.    Respiratory:  Negative for shortness of breath.    Cardiovascular:  Negative for chest pain.   Gastrointestinal:  Positive for abdominal pain. Negative for nausea.   Genitourinary:  Negative for dysuria and vaginal bleeding.   Musculoskeletal:  Negative for back pain.   Skin:  Negative for rash.   Neurological:  Negative for weakness.   Hematological:  Does not bruise/bleed easily.       Physical Exam     Initial Vitals   BP Pulse Resp Temp SpO2   25 0606 25 " 0603 25 0606 25 0606 25 0603   122/66 71 20 98.2 °F (36.8 °C) 98 %      MAP       --                Physical Exam    Vitals reviewed.  Constitutional: She appears well-developed and well-nourished.   HENT:   Head: Normocephalic and atraumatic.   Eyes: EOM are normal.   Neck:   Normal range of motion.  Cardiovascular:  Normal rate.           Pulmonary/Chest: No respiratory distress.   Abdominal: Abdomen is soft. There is no abdominal tenderness.   Musculoskeletal:         General: Normal range of motion.      Cervical back: Normal range of motion.     Neurological: She is oriented to person, place, and time.   Skin: Skin is warm and dry.   Psychiatric: She has a normal mood and affect.         ED Course   Obtain Fetal nonstress test (NST)    Date/Time: 2025 6:37 AM    Performed by: Parul Cooper MD  Authorized by: Meeta Looney MD    Nonstress Test:     Variability:  6-25 BPM    Decelerations:  None    Accelerations:  10 bpm    Baseline:  145    Contractions:  Not present  Biophysical Profile:     Overall Impression:  Reassuring  Post-procedure:     Patient tolerance:  Patient tolerated the procedure well with no immediate complications    Labs Reviewed - No data to display       Imaging Results    None          Medications   famotidine tablet 20 mg (20 mg Oral Given 25)   acetaminophen tablet 1,000 mg (1,000 mg Oral Given 25)     Medical Decision Making  Yvrose Dye is a 35 y.o.  at 27w2d who presents to the OB ED today (2025) with CC of abdominal pain and decreased fetal movements.      Temp:  [98.2 °F (36.8 °C)] 98.2 °F (36.8 °C)  Pulse:  [71-84] 77  Resp:  [20] 20  SpO2:  [98 %-100 %] 100 %  BP: (113-122)/(59-66) 113/59    NST: 145 mod samara +accels, -decels  [place]: quiet    Abdominal Pain  - Upper abdominal pain starting yesterday. Dull, unlike typical burning/GERD pain.   - On exam: abdomen soft, gravid. Mildly tender to palpation at epigastric area. No  rebound or guarding  - No diarrhea, constipation or vomiting.   - No VB/LoF. Patient unsure if having contractions.  - VSS, patient afebrile.   - Pepcid and tylenol given > pain decreased     No Fetal Movements  - Patient notes she has yet to feel movements this pregnancy  - This is not typical for her compared to prior pregnancies  - Ultrasound: Placenta site anterior, fetal movements visualized, MVP appropriate  - Patient reassured regarding character of movements being different with each pregnancy    Patient with abdominal pain and no fetal movements. Abdominal pain improved and US w/adequate MVP and fetal movements. Given fetal and maternal status reassuring, will discharge home. Return precautions reviewed.     Parul Cooper MD  OBGYN   PGY-1      Risk  OTC drugs.              Attending Attestation:   Physician Attestation Statement for Resident:  As the supervising MD   Physician Attestation Statement: I have personally seen and examined this patient.   I agree with the above history.  -:   As the supervising MD I agree with the above PE.     As the supervising MD I agree with the above treatment, course, plan, and disposition.   I was personally present during the critical portions of the procedure(s) performed by the resident and was immediately available in the ED to provide services and assistance as needed during the entire procedure.   I have reviewed the following: old records at this facility.                                       Clinical Impression:  Final diagnoses:  [O36.8120] Decreased fetal movements in second trimester, single or unspecified fetus (Primary)  [Z3A.27] 27 weeks gestation of pregnancy          ED Disposition Condition    Discharge Stable          ED Prescriptions    None       Follow-up Information    None              Parul Cooper MD  Resident  05/20/25 9823         [1]   Social History  Tobacco Use    Smoking status: Never    Smokeless tobacco: Never   Substance Use Topics     Alcohol use: Not Currently    Drug use: Not Currently     Types: Marijuana     Comment: last used 12/2024        Orquidea Olson MD  05/23/25 0726

## 2025-05-26 NOTE — ASSESSMENT & PLAN NOTE
See prior notes for full counseling.   Reports diagnosis was possibly made around the end of 2024, although records suggest it was during a 2023 ED visit for feeling poorly.   Managed on Dovato, reports adherence with this medication.  Gets medication through Lea Richard NP in [place], but has not met with her in over 6 months. We discussed scheduling an appt with her versus establishing with Infectious Disease at [facility]. Patient desires to establish with ID. We will send message to their office to facilitate today.    Patient's regimen is Dovato (dolutegravir-lamivudine) which is considered a safe and effective regimen for treatment of HIV in pregnancy. The most recent data indicates the prevalence of NTDs in infants born to pregnant women with HIV receiving DTG at conception is no longer statistically different than in those receiving other antiretrovirals.     Recommendations:  Referral to ID was placed at last visit; defer baseline laboratory evaluation, prophylaxis for opportunistic infections, genotyping, and cART regimen to ID - patient ameable to being rescheduled (has not seen anyone for HIV in > 6 months)  Patient did not show up to scheduled visit April 2025  Ensure vaccinations are up to date: influenza, hepatitis A, hepatitis B, pneumovax, and TDAP. If COVID vaccine deemed appropriate by ID, offer as able  Continue current regimen: Dovato (dolutegravir-lamivudine) -  Stressed importance of compliance for both decreasing risk of vertical transmission and decreasing risk of resistance in the strain   Health maintenance: pap smear, TB screen, and STI testing per ID or OB; repeat STI testing in 3rd trimester  Amniocentesis has previously been avoided in the past. If this is considered, it would be recommended to at least have an undetectable viral load before pursuing this.  Growth assessment q4 weeks  Viral load (VL) monitoring:  Monitor monthly until undetectable  If VL undetectable, monitor every  trimester, at 36 weeks, and upon admission to L&D  Monitor CD4 count every trimester if < 300  Delivery:  If VL is > 1000 copies/mL prior to 38 weeks', perform pre-labor C/S at 38 0/7 wga to reduce risk of vertical transmission - discussed this as a possibility. She has notably had 3 prior vaginal deliveries with pelvis proven to at least 7#. Encouraged compliance from standpoint of attempt to avoid major abdominal surgery as well.   If VL is <= 1000 copies/mL and patient is on cART, C/S is typically reserved for obstetric indications only and delivery can take place at or beyond 39 weeks gestation (unless an indication arises earlier)  Avoid use of fetal scalp electrode  AROM and operative vaginal delivery per obstetric indication, but avoid if possible if viral load >= 50 copies/mL  Continue cART regimen during L&D. [facility] recommends administration of intrapartum Zidovudine (ZDV) to all patients with HIV prior to  or during vaginal delivery, regardless of VL. Goal is to administer at least 3 hours of ZDV prior to delivery. Dose is 1-hour loading dose at 2 mg/kg followed by continuous infusion of 1 mg/kg/hr until delivery.  Avoid methergine with certain protease inhibitors.  Notify pediatrics so that appropriate postexposure prophylaxis and HIV testing is performed.  Patient counseled re: condom use; partner should be evaluated for PrEP.  Postpartum care should include contraceptive counseling (LARC should be considered), continued compliance with ID & cART, and monitoring for peripartum mood disorders. Patient should not breastfeed.  If patient has a viral load of >1000 copies and presents with ROM or labor, you may call [phone] for HIV expert to help develop an individualized delivery plan.

## 2025-05-27 ENCOUNTER — PROCEDURE VISIT (OUTPATIENT)
Dept: MATERNAL FETAL MEDICINE | Facility: CLINIC | Age: 35
End: 2025-05-27
Payer: MEDICAID

## 2025-05-27 ENCOUNTER — OFFICE VISIT (OUTPATIENT)
Dept: MATERNAL FETAL MEDICINE | Facility: CLINIC | Age: 35
End: 2025-05-27
Payer: MEDICAID

## 2025-05-27 DIAGNOSIS — O98.712 HIV DISEASE AFFECTING PREGNANCY IN SECOND TRIMESTER: Primary | ICD-10-CM

## 2025-05-27 DIAGNOSIS — O98.712 HIV DISEASE AFFECTING PREGNANCY IN SECOND TRIMESTER: ICD-10-CM

## 2025-05-27 DIAGNOSIS — E66.811 OBESITY, CLASS I, BMI 30-34.9: Chronic | ICD-10-CM

## 2025-05-27 DIAGNOSIS — Z36.89 ENCOUNTER FOR ULTRASOUND TO ASSESS FETAL GROWTH: ICD-10-CM

## 2025-05-27 PROCEDURE — 99214 OFFICE O/P EST MOD 30 MIN: CPT | Mod: S$PBB,TH,, | Performed by: STUDENT IN AN ORGANIZED HEALTH CARE EDUCATION/TRAINING PROGRAM

## 2025-05-27 PROCEDURE — 76816 OB US FOLLOW-UP PER FETUS: CPT | Mod: PBBFAC | Performed by: STUDENT IN AN ORGANIZED HEALTH CARE EDUCATION/TRAINING PROGRAM

## 2025-05-27 NOTE — PROGRESS NOTES
Maternal Fetal Medicine follow up consult    SUBJECTIVE:     Yvrose Dye is a 35 y.o.  female with IUP at 28w2d who is seen in follow up consultation by MFM.  Pregnancy complications include:   Problem   HIV Disease Affecting Pregnancy in Second Trimester       Previous notes reviewed.   No changes to medical, surgical, family, social, or obstetric history.    Interval history since last MFM visit:       Medications reviewed.    Care team members:  Dr. Ferro - Primary OB  Isi Richard, [facility] - prescribing ART      OBJECTIVE:   /57      Ultrasound performed. See viewpoint for full ultrasound report.  Lee live IUP  Fetal size is appropriate for gestational age, with the EFW (1215 g) plotting at the 45% and the AC plotting at the 49%.   A limited repeat fetal anatomic survey appears normal.   The MVP is normal.  Presentation is breech.     Significant labs/imagin25: quant < 20; CD4 363    ASSESSMENT/PLAN:     35 y.o.  female with IUP at 28w2d    HIV disease affecting pregnancy in second trimester  See prior notes for full counseling.   Reports diagnosis was possibly made around the end of , although records suggest it was during a  ED visit for feeling poorly.   Managed on Dovato, reports adherence with this medication.  Gets medication through Lea Richard NP in [place], but has not met with her in over 6 months. We discussed scheduling an appt with her versus establishing with Infectious Disease at [facility]. Patient desires to establish with ID. We will send message to their office to facilitate today.    Patient's regimen is Dovato (dolutegravir-lamivudine) which is considered a safe and effective regimen for treatment of HIV in pregnancy. The most recent data indicates the prevalence of NTDs in infants born to pregnant women with HIV receiving DTG at conception is no longer statistically different than in those receiving other antiretrovirals.      Recommendations:  Referral to ID was placed at last visit; defer baseline laboratory evaluation, prophylaxis for opportunistic infections, genotyping, and cART regimen to ID - patient ameable to being rescheduled (has not seen anyone for HIV in > 6 months)  Patient did not show up to scheduled visit April 2025  Ensure vaccinations are up to date: influenza, hepatitis A, hepatitis B, pneumovax, and TDAP. If COVID vaccine deemed appropriate by ID, offer as able  Continue current regimen: Dovato (dolutegravir-lamivudine) -  Stressed importance of compliance for both decreasing risk of vertical transmission and decreasing risk of resistance in the strain   Health maintenance: pap smear, TB screen, and STI testing per ID or OB; repeat STI testing in 3rd trimester  Amniocentesis has previously been avoided in the past. If this is considered, it would be recommended to at least have an undetectable viral load before pursuing this.  Growth assessment q4 weeks  Viral load (VL) monitoring:  Monitor monthly until undetectable  If VL undetectable, monitor every trimester, at 36 weeks, and upon admission to L&D  Monitor CD4 count every trimester if < 300  Delivery:  If VL is > 1000 copies/mL prior to 38 weeks', perform pre-labor C/S at 38 0/7 wga to reduce risk of vertical transmission - discussed this as a possibility. She has notably had 3 prior vaginal deliveries with pelvis proven to at least 7#. Encouraged compliance from standpoint of attempt to avoid major abdominal surgery as well.   If VL is <= 1000 copies/mL and patient is on cART, C/S is typically reserved for obstetric indications only and delivery can take place at or beyond 39 weeks gestation (unless an indication arises earlier)  Avoid use of fetal scalp electrode  AROM and operative vaginal delivery per obstetric indication, but avoid if possible if viral load >= 50 copies/mL  Continue cART regimen during L&D. [facility] recommends administration of intrapartum  Zidovudine (ZDV) to all patients with HIV prior to  or during vaginal delivery, regardless of VL. Goal is to administer at least 3 hours of ZDV prior to delivery. Dose is 1-hour loading dose at 2 mg/kg followed by continuous infusion of 1 mg/kg/hr until delivery.  Avoid methergine with certain protease inhibitors.  Notify pediatrics so that appropriate postexposure prophylaxis and HIV testing is performed.  Patient counseled re: condom use; partner should be evaluated for PrEP.  Postpartum care should include contraceptive counseling (LARC should be considered), continued compliance with ID & cART, and monitoring for peripartum mood disorders. Patient should not breastfeed.  If patient has a viral load of >1000 copies and presents with ROM or labor, you may call [phone] for HIV expert to help develop an individualized delivery plan.      Follow up in 4 weeks for MD visit. Message sent to ID for rescheduling.     Pita Escamilla MD  PGY 7  Maternal Fetal Medicine  [facility]

## 2025-06-08 ENCOUNTER — PATIENT MESSAGE (OUTPATIENT)
Dept: OTHER | Facility: OTHER | Age: 35
End: 2025-06-08
Payer: MEDICAID

## 2025-06-18 ENCOUNTER — TELEPHONE (OUTPATIENT)
Dept: OBSTETRICS AND GYNECOLOGY | Facility: CLINIC | Age: 35
End: 2025-06-18
Payer: MEDICAID

## 2025-06-18 ENCOUNTER — ROUTINE PRENATAL (OUTPATIENT)
Dept: OBSTETRICS AND GYNECOLOGY | Facility: CLINIC | Age: 35
End: 2025-06-18
Payer: MEDICAID

## 2025-06-18 ENCOUNTER — RESULTS FOLLOW-UP (OUTPATIENT)
Dept: OBSTETRICS AND GYNECOLOGY | Facility: CLINIC | Age: 35
End: 2025-06-18

## 2025-06-18 VITALS
DIASTOLIC BLOOD PRESSURE: 79 MMHG | BODY MASS INDEX: 42.12 KG/M2 | SYSTOLIC BLOOD PRESSURE: 131 MMHG | WEIGHT: 245.38 LBS

## 2025-06-18 DIAGNOSIS — O99.019 ANEMIA AFFECTING PREGNANCY, ANTEPARTUM: ICD-10-CM

## 2025-06-18 DIAGNOSIS — O98.712 HIV DISEASE AFFECTING PREGNANCY IN SECOND TRIMESTER: ICD-10-CM

## 2025-06-18 DIAGNOSIS — O09.90 HIGH RISK PREGNANCY, ANTEPARTUM: Primary | ICD-10-CM

## 2025-06-18 PROCEDURE — 99999 PR PBB SHADOW E&M-EST. PATIENT-LVL II: CPT | Mod: PBBFAC,,, | Performed by: OBSTETRICS & GYNECOLOGY

## 2025-06-18 PROCEDURE — 99212 OFFICE O/P EST SF 10 MIN: CPT | Mod: PBBFAC,TH,PN | Performed by: OBSTETRICS & GYNECOLOGY

## 2025-06-18 PROCEDURE — 99215 OFFICE O/P EST HI 40 MIN: CPT | Mod: S$PBB,TH,, | Performed by: OBSTETRICS & GYNECOLOGY

## 2025-06-18 NOTE — PROGRESS NOTES
34yo  at 31w3d.  HR OB due to HIV (on ART), and insufficient PNC.  Has not been seen in 10 weeks, missed glucola.  Overall doing well today.  Better compliance with medications now, has ID appt next month.  No OB complaints today.  No ctx/vb/lof.  +FM.  Taking PNV.        A/P:  HR OB visit today.   Discussed importance of routine prenatal visits.  She is high risk and will need to be seen frequently and routinely throughout the remainder of the pregnancy.   Continue f/u with MFM, next US and appt   Discussed importance of labwork, including viral load and CD 4 count every trimester.  Will go to lab today for glucola and 3T labs.    Anemia on last labs - will check iron levels today, may need iron infusions.    Last US reviewed - breech presentation.  H/o 3 , will f/u presentation on next US.   OB ED precautions given.    Counseling done, precautions given, all questions answered.  RTC 2 wks for OB visit and tdap/RSV    Shaila Ferro MD

## 2025-06-18 NOTE — TELEPHONE ENCOUNTER
Copied from CRM #[id number]. Topic: General Inquiry - Patient Advice  >> Jun 18, 2025 10:59 AM Dyana wrote:  Would you like a call back, or a response through your [facility] portal?:   Call    Comments: Patient came in to see you on today, and she is still there trying to give you some paperwork. Please give the patient a call.

## 2025-06-19 ENCOUNTER — PATIENT MESSAGE (OUTPATIENT)
Dept: OBSTETRICS AND GYNECOLOGY | Facility: CLINIC | Age: 35
End: 2025-06-19
Payer: MEDICAID

## 2025-06-19 DIAGNOSIS — D50.8 OTHER IRON DEFICIENCY ANEMIA: Primary | ICD-10-CM

## 2025-06-19 PROBLEM — D50.9 IRON DEFICIENCY ANEMIA: Status: ACTIVE | Noted: 2025-06-19

## 2025-06-19 RX ORDER — SODIUM FERRIC GLUCONATE COMPLEX IN SUCROSE 12.5 MG/ML
125 INJECTION INTRAVENOUS
OUTPATIENT
Start: 2025-06-19

## 2025-06-19 RX ORDER — HEPARIN 100 UNIT/ML
500 SYRINGE INTRAVENOUS
OUTPATIENT
Start: 2025-06-19

## 2025-06-19 RX ORDER — EPINEPHRINE 0.3 MG/.3ML
0.3 INJECTION SUBCUTANEOUS ONCE AS NEEDED
OUTPATIENT
Start: 2025-06-19

## 2025-06-19 RX ORDER — SODIUM CHLORIDE 0.9 % (FLUSH) 0.9 %
10 SYRINGE (ML) INJECTION
OUTPATIENT
Start: 2025-06-19

## 2025-06-22 ENCOUNTER — PATIENT MESSAGE (OUTPATIENT)
Dept: OTHER | Facility: OTHER | Age: 35
End: 2025-06-22
Payer: MEDICAID

## 2025-06-24 ENCOUNTER — TELEPHONE (OUTPATIENT)
Dept: OBSTETRICS AND GYNECOLOGY | Facility: CLINIC | Age: 35
End: 2025-06-24
Payer: MEDICAID

## 2025-06-24 NOTE — TELEPHONE ENCOUNTER
Called patient to let her know that her paperwork came back to to be signed and I sent it back today     Copied from CRM #[id number]. Topic: General Inquiry - Patient Advice  >> Jun 24, 2025 12:28 PM Juany wrote:  Name of Who is Calling:pt       What is the request in detail:pt is asking for a call back today bc her fmla has not been received and the deadline is tomorrow. She is asking if someone would please call her today regarding this. Please call to assist       Can the clinic reply by [facility]:call      What Number to Call Back if not in [facility]:[phone]

## 2025-06-26 ENCOUNTER — PROCEDURE VISIT (OUTPATIENT)
Dept: MATERNAL FETAL MEDICINE | Facility: CLINIC | Age: 35
End: 2025-06-26
Payer: MEDICAID

## 2025-06-26 ENCOUNTER — OFFICE VISIT (OUTPATIENT)
Dept: MATERNAL FETAL MEDICINE | Facility: CLINIC | Age: 35
End: 2025-06-26
Payer: MEDICAID

## 2025-06-26 VITALS
DIASTOLIC BLOOD PRESSURE: 82 MMHG | WEIGHT: 241.63 LBS | HEIGHT: 64 IN | BODY MASS INDEX: 41.25 KG/M2 | SYSTOLIC BLOOD PRESSURE: 133 MMHG

## 2025-06-26 DIAGNOSIS — Z36.89 ENCOUNTER FOR ULTRASOUND TO ASSESS FETAL GROWTH: ICD-10-CM

## 2025-06-26 DIAGNOSIS — Z36.89 ENCOUNTER FOR ULTRASOUND TO ASSESS FETAL GROWTH: Primary | ICD-10-CM

## 2025-06-26 DIAGNOSIS — O98.712 HIV DISEASE AFFECTING PREGNANCY IN SECOND TRIMESTER: Primary | ICD-10-CM

## 2025-06-26 PROCEDURE — 3008F BODY MASS INDEX DOCD: CPT | Mod: CPTII,,, | Performed by: STUDENT IN AN ORGANIZED HEALTH CARE EDUCATION/TRAINING PROGRAM

## 2025-06-26 PROCEDURE — 76816 OB US FOLLOW-UP PER FETUS: CPT | Mod: PBBFAC | Performed by: STUDENT IN AN ORGANIZED HEALTH CARE EDUCATION/TRAINING PROGRAM

## 2025-06-26 PROCEDURE — 1159F MED LIST DOCD IN RCRD: CPT | Mod: CPTII,,, | Performed by: STUDENT IN AN ORGANIZED HEALTH CARE EDUCATION/TRAINING PROGRAM

## 2025-06-26 PROCEDURE — 3079F DIAST BP 80-89 MM HG: CPT | Mod: CPTII,,, | Performed by: STUDENT IN AN ORGANIZED HEALTH CARE EDUCATION/TRAINING PROGRAM

## 2025-06-26 PROCEDURE — 99999 PR PBB SHADOW E&M-EST. PATIENT-LVL II: CPT | Mod: PBBFAC,,, | Performed by: STUDENT IN AN ORGANIZED HEALTH CARE EDUCATION/TRAINING PROGRAM

## 2025-06-26 PROCEDURE — 3075F SYST BP GE 130 - 139MM HG: CPT | Mod: CPTII,,, | Performed by: STUDENT IN AN ORGANIZED HEALTH CARE EDUCATION/TRAINING PROGRAM

## 2025-06-26 PROCEDURE — 99212 OFFICE O/P EST SF 10 MIN: CPT | Mod: PBBFAC,TH | Performed by: STUDENT IN AN ORGANIZED HEALTH CARE EDUCATION/TRAINING PROGRAM

## 2025-06-26 PROCEDURE — 99213 OFFICE O/P EST LOW 20 MIN: CPT | Mod: S$PBB,TH,, | Performed by: STUDENT IN AN ORGANIZED HEALTH CARE EDUCATION/TRAINING PROGRAM

## 2025-06-26 NOTE — PROGRESS NOTES
"Maternal Fetal Medicine follow up consult    SUBJECTIVE:     Yvrose Dye is a 35 y.o.  female with IUP at 32w4d who is seen in follow up consultation by MFM.  Pregnancy complications include:   Problem   HIV Disease Affecting Pregnancy in Second Trimester       Previous notes reviewed.   No changes to medical, surgical, family, social, or obstetric history.    Interval history since last MFM visit: Endorses some discomforts of pregnancy, mostly at night. They seem to resolve with repositioning. Denies decreased fetal movement, vaginal bleeding/spotting, loss of fluid or pain/contractions increasing in intensity and frequency.     Medications reviewed. She's taking Dovato consistently. Sometimes missing a day or two of dose when Rx refill time comes.     Care team members:  Primary OB - Dr. Shaila Ferro       OBJECTIVE:   /82 (BP Location: Right arm, Patient Position: Sitting)   Ht 5' 4" (1.626 m)   Wt 109.6 kg (241 lb 10 oz)   LMP  (LMP Unknown) Comment: pt has nexplanon  BMI 41.47 kg/m²      Physical Exam   WNWD female appearing stated age, in NAD   No conversational dyspnea  Gravid abdomen    Ultrasound performed. See viewpoint for full ultrasound report.  Lee live IUP  Fetal size is appropriate for gestational age, with the EFW (2084 g) plotting at the 32% and the AC plotting at the 64%.   A limited repeat fetal anatomic survey appears normal.   The BPP score is normal at 8/8.  The MVP is normal.  breech presentation.     Significant labs/imaging:  HIV Quant - detectable, <20 copies/mL as of     ASSESSMENT/PLAN:     35 y.o.  female with IUP at 32w4d    The following were addressed today.     Assessment & Plan  HIV disease affecting pregnancy in second trimester  See prior notes for full counseling.   Reports diagnosis was possibly made around the end of , although records suggest it was during a  ED visit for feeling poorly.   Managed on Dovato, reports adherence with " this medication.  Gets medication through Lea Richard NP in [place], but has not met with her in over 6 months. We discussed scheduling an appt with her versus establishing with Infectious Disease at [facility]. Patient desires to establish with ID. We will send message to their office to facilitate today.    Patient's regimen is Dovato (dolutegravir-lamivudine) which is considered a safe and effective regimen for treatment of HIV in pregnancy. The most recent data indicates the prevalence of NTDs in infants born to pregnant women with HIV receiving DTG at conception is no longer statistically different than in those receiving other antiretrovirals.     Recommendations:  Referral to ID was placed at last visit; defer baseline laboratory evaluation, prophylaxis for opportunistic infections, genotyping, and cART regimen to ID - patient ameable to being rescheduled (has not seen anyone for HIV in > 6 months)  Patient did not show up to scheduled visit April 2025, next is scheduled 7/17/2025 with Dr. Orozco at [facility] which we reviewed today   Ensure vaccinations are up to date: influenza, hepatitis A, hepatitis B, pneumovax, and TDAP. If COVID vaccine deemed appropriate by ID, offer as able  Continue current regimen: Dovato (dolutegravir-lamivudine), stressed importance of compliance for both decreasing risk of vertical transmission and decreasing risk of resistance in the strain   Health maintenance: pap smear, TB screen, and STI testing per ID or OB; repeat STI testing in 3rd trimester  Growth assessment q4 weeks - M to facilitate  Viral load (VL) monitoring:  Monitor monthly until undetectable - last lab 6/18 detectable with <20 copies/mL  If VL undetectable, monitor every trimester, at 36 weeks, and upon admission to L&D  Monitor CD4 count every trimester if < 300  Delivery: see original consult for full details and recs surrounding delivery and postpartum care  If VL is > 1000 copies/mL prior to 38  weeks', perform pre-labor C/S at 38 0/7 wga to reduce risk of vertical transmission - discussed this as a possibility. She has notably had 3 prior vaginal deliveries with pelvis proven to at least 7#. However, we also discussed BREECH presentation today and how this may factor in. She would be a candidate for ECV if from a VL standpoint she remains a candidate for vaginal delivery as we approach delivery date    Please see original MFM consultation for full details regarding management recommendations of these and other obstetric co-morbidities.    FOLLOW UP  F/u in 4 weeks for growth US/MFM MD visit      25 minutes of total time spent on the encounter, which includes face to face time and non-face to face time preparing to see the patient (eg, review of tests), obtaining and/or reviewing separately obtained history, documenting clinical information in the electronic or other health record, independently interpreting results (not separately reported) and communicating results to the patient/family/caregiver, or care coordination (not separately reported).    Bronwyn Mei MD  Maternal Fetal Medicine

## 2025-07-03 ENCOUNTER — ROUTINE PRENATAL (OUTPATIENT)
Dept: OBSTETRICS AND GYNECOLOGY | Facility: CLINIC | Age: 35
End: 2025-07-03
Payer: MEDICAID

## 2025-07-03 VITALS — SYSTOLIC BLOOD PRESSURE: 142 MMHG | BODY MASS INDEX: 41.97 KG/M2 | WEIGHT: 244.5 LBS | DIASTOLIC BLOOD PRESSURE: 81 MMHG

## 2025-07-03 DIAGNOSIS — O98.712 HIV DISEASE AFFECTING PREGNANCY IN SECOND TRIMESTER: ICD-10-CM

## 2025-07-03 DIAGNOSIS — Z23 NEED FOR TDAP VACCINATION: ICD-10-CM

## 2025-07-03 DIAGNOSIS — O99.019 ANEMIA AFFECTING PREGNANCY, ANTEPARTUM: ICD-10-CM

## 2025-07-03 DIAGNOSIS — Z3A.33 33 WEEKS GESTATION OF PREGNANCY: Primary | ICD-10-CM

## 2025-07-03 DIAGNOSIS — O09.90 HIGH RISK PREGNANCY, ANTEPARTUM: ICD-10-CM

## 2025-07-03 DIAGNOSIS — Z29.11 NEED FOR RSV IMMUNIZATION: ICD-10-CM

## 2025-07-03 PROCEDURE — 90472 IMMUNIZATION ADMIN EACH ADD: CPT | Mod: PBBFAC,PN

## 2025-07-03 PROCEDURE — 90715 TDAP VACCINE 7 YRS/> IM: CPT | Mod: PBBFAC,PN

## 2025-07-03 PROCEDURE — 99999PBSHW PR PBB SHADOW TECHNICAL ONLY FILED TO HB: Mod: PBBFAC,,,

## 2025-07-03 PROCEDURE — 99999 PR PBB SHADOW E&M-EST. PATIENT-LVL II: CPT | Mod: PBBFAC,,,

## 2025-07-03 PROCEDURE — 90471 IMMUNIZATION ADMIN: CPT | Mod: PBBFAC,PN

## 2025-07-03 PROCEDURE — 90678 RSV VACC PREF BIVALENT IM: CPT | Mod: PBBFAC,PN

## 2025-07-03 PROCEDURE — 99212 OFFICE O/P EST SF 10 MIN: CPT | Mod: PBBFAC,TH,PN,25

## 2025-07-03 RX ORDER — DOCUSATE SODIUM 100 MG/1
100 CAPSULE, LIQUID FILLED ORAL 2 TIMES DAILY
Qty: 60 CAPSULE | Refills: 1 | Status: SHIPPED | OUTPATIENT
Start: 2025-07-03 | End: 2026-07-03

## 2025-07-03 RX ADMIN — CLOSTRIDIUM TETANI TOXOID ANTIGEN (FORMALDEHYDE INACTIVATED), CORYNEBACTERIUM DIPHTHERIAE TOXOID ANTIGEN (FORMALDEHYDE INACTIVATED), BORDETELLA PERTUSSIS TOXOID ANTIGEN (GLUTARALDEHYDE INACTIVATED), BORDETELLA PERTUSSIS FILAMENTOUS HEMAGGLUTININ ANTIGEN (FORMALDEHYDE INACTIVATED), BORDETELLA PERTUSSIS PERTACTIN ANTIGEN, AND BORDETELLA PERTUSSIS FIMBRIAE 2/3 ANTIGEN 0.5 ML: 5; 2; 2.5; 5; 3; 5 INJECTION, SUSPENSION INTRAMUSCULAR at 10:07

## 2025-07-03 RX ADMIN — Medication 120 MCG: at 10:07

## 2025-07-03 NOTE — PROGRESS NOTES
34yo  at 33w4d   HR OB due to HIV (on ART), and insufficient PNC.  Overall doing well today.  Reports she's taking Dovato consistently. Sometimes missing a day or two of dose when Rx refills needed.  Better compliance with medications now, has ID appt .  Some lower back aches and pelvic pain but imrpoves after getting off of feet has not taken tylenol. Getting over a cold from last week s/s improved.  No ctx/vb/lof.  +FM.  Taking PNV.        A/P:  HR OB visit with growth scans every 4 weeks  Discussed importance of routine prenatal visits.  She is high risk and will need to be seen frequently and routinely throughout the remainder of the pregnancy.   Continue f/u with MFM, next US and appt   Discussed importance of labwork, including viral load and CD 4 count every trimester.    Tdap and RSV vax today.  Oral Fe with colace until Fe infusions ordered. Pt agreeable to Fe infusions at [facility] at this time.  Last US reviewed - breech presentation.  H/o 3 , will f/u presentation on next US.   OB ED precautions given.    Counseling done, precautions given, all questions answered.  RTC 2 wks for OB visit.     YOSELYN Aldridge  SBPH [facility]

## 2025-07-13 ENCOUNTER — PATIENT MESSAGE (OUTPATIENT)
Dept: OTHER | Facility: OTHER | Age: 35
End: 2025-07-13
Payer: MEDICAID

## 2025-07-14 ENCOUNTER — PATIENT MESSAGE (OUTPATIENT)
Dept: INFECTIOUS DISEASES | Facility: CLINIC | Age: 35
End: 2025-07-14
Payer: MEDICAID

## 2025-07-24 ENCOUNTER — PROCEDURE VISIT (OUTPATIENT)
Dept: MATERNAL FETAL MEDICINE | Facility: CLINIC | Age: 35
End: 2025-07-24
Payer: MEDICAID

## 2025-07-24 ENCOUNTER — OFFICE VISIT (OUTPATIENT)
Dept: MATERNAL FETAL MEDICINE | Facility: CLINIC | Age: 35
End: 2025-07-24
Attending: OBSTETRICS & GYNECOLOGY
Payer: MEDICAID

## 2025-07-24 ENCOUNTER — TELEPHONE (OUTPATIENT)
Dept: OBSTETRICS AND GYNECOLOGY | Facility: CLINIC | Age: 35
End: 2025-07-24
Payer: MEDICAID

## 2025-07-24 VITALS
BODY MASS INDEX: 42.33 KG/M2 | SYSTOLIC BLOOD PRESSURE: 149 MMHG | DIASTOLIC BLOOD PRESSURE: 84 MMHG | WEIGHT: 246.56 LBS

## 2025-07-24 DIAGNOSIS — Z36.89 ENCOUNTER FOR ULTRASOUND TO ASSESS FETAL GROWTH: ICD-10-CM

## 2025-07-24 DIAGNOSIS — O98.712 HIV DISEASE AFFECTING PREGNANCY IN SECOND TRIMESTER: Primary | ICD-10-CM

## 2025-07-24 PROCEDURE — 99999 PR PBB SHADOW E&M-EST. PATIENT-LVL II: CPT | Mod: PBBFAC,,, | Performed by: OBSTETRICS & GYNECOLOGY

## 2025-07-24 PROCEDURE — 99212 OFFICE O/P EST SF 10 MIN: CPT | Mod: PBBFAC,TH | Performed by: OBSTETRICS & GYNECOLOGY

## 2025-07-24 PROCEDURE — 76819 FETAL BIOPHYS PROFIL W/O NST: CPT | Mod: PBBFAC | Performed by: OBSTETRICS & GYNECOLOGY

## 2025-07-24 PROCEDURE — 76816 OB US FOLLOW-UP PER FETUS: CPT | Mod: PBBFAC | Performed by: OBSTETRICS & GYNECOLOGY

## 2025-07-24 NOTE — ASSESSMENT & PLAN NOTE
See prior notes for full counseling.   Reports diagnosis was possibly made around the end of 2024, although records suggest it was during a 2023 ED visit for feeling poorly.   Managed on Dovato, reports adherence with this medication.  Gets medication through Lea Richard NP in [place], but has not met with her in over 6 months. We discussed scheduling an appt with her versus establishing with Infectious Disease at [facility]. Patient desires to establish with ID, but has not attended any of the scheduled appt.     Patient's regimen is Dovato (dolutegravir-lamivudine) which is considered a safe and effective regimen for treatment of HIV in pregnancy. The most recent data indicates the prevalence of NTDs in infants born to pregnant women with HIV receiving DTG at conception is no longer statistically different than in those receiving other antiretrovirals.      Recommendations:  Referral to ID placed prior; defer baseline laboratory evaluation, prophylaxis for opportunistic infections, genotyping, and cART regimen to ID - patient ameable to being rescheduled (has not seen anyone for HIV in > 6 months)  Patient missed her initial appt with ID 7/23/25, now rescheduled for 9/26/25  Ensure vaccinations are up to date: influenza, hepatitis A, hepatitis B, pneumovax, and TDAP. If COVID vaccine deemed appropriate by ID, offer as able  Continue current regimen: Dovato (dolutegravir-lamivudine), stressed importance of compliance for both decreasing risk of vertical transmission and decreasing risk of resistance in the strain   Health maintenance: pap smear, TB screen, and STI testing per ID or OB; repeat STI testing in 3rd trimester  Growth assessment q4 weeks - [facility] to facilitate  Viral load (VL) monitoring:  Monitor monthly until undetectable - last lab 6/18 detected <20  New labs ordered today.  If VL undetectable, monitor every trimester, at 36 weeks, and upon admission to L&D  Monitor CD4 count every trimester if <  300  Delivery: see original consult for full details and recs surrounding delivery and postpartum care  If VL is > 1000 copies/mL prior to 38 weeks', perform pre-labor C/S at 38 0/7 wga to reduce risk of vertical transmission - discussed this as a possibility. She has notably had 3 prior vaginal deliveries with pelvis proven to at least 7#.

## 2025-07-24 NOTE — PROGRESS NOTES
Maternal Fetal Medicine follow up consult    SUBJECTIVE:     Yvrose Dye is a 35 y.o.  female with IUP at 36w4d who is seen in follow up consultation by MFM.  Pregnancy complications include:   Problem   HIV Disease Affecting Pregnancy in Second Trimester       Previous notes reviewed.   No changes to medical, surgical, family, social, or obstetric history.    Interval history since last MFM visit: uneventful    Medications reviewed.    Care team members:  Pillo - Primary OB       OBJECTIVE:   LMP  (LMP Unknown) Comment: pt has nexplanon    Physical Exam - deferred    Ultrasound performed. See viewpoint for full ultrasound report.  Impression   =========   Fetal size is AGA with the EFW plotting at the 52% and the AC plotting at the 85%.   The EFW is 3081 g.   A limited repeat fetal anatomic survey shows no abnormalities of the structures that were adequately imaged.   The BPP score is reassuring at 8/8, and the AFV is normal.     ASSESSMENT/PLAN:     35 y.o.  female with IUP at 36w4d    Assessment & Plan  HIV disease affecting pregnancy in second trimester  See prior notes for full counseling.   Reports diagnosis was possibly made around the end of , although records suggest it was during a  ED visit for feeling poorly.   Managed on Dovato, reports adherence with this medication.  Gets medication through Lea Richard NP in [place], but has not met with her in over 6 months. We discussed scheduling an appt with her versus establishing with Infectious Disease at [facility]. Patient desires to establish with ID, but has not attended any of the scheduled appt.     Patient's regimen is Dovato (dolutegravir-lamivudine) which is considered a safe and effective regimen for treatment of HIV in pregnancy. The most recent data indicates the prevalence of NTDs in infants born to pregnant women with HIV receiving DTG at conception is no longer statistically different than in those receiving other  antiretrovirals.      Recommendations:  Referral to ID placed prior; defer baseline laboratory evaluation, prophylaxis for opportunistic infections, genotyping, and cART regimen to ID - patient ameable to being rescheduled (has not seen anyone for HIV in > 6 months)  Patient missed her initial appt with ID 7/23/25, now rescheduled for 9/26/25  Ensure vaccinations are up to date: influenza, hepatitis A, hepatitis B, pneumovax, and TDAP. If COVID vaccine deemed appropriate by ID, offer as able  Continue current regimen: Dovato (dolutegravir-lamivudine), stressed importance of compliance for both decreasing risk of vertical transmission and decreasing risk of resistance in the strain   Health maintenance: pap smear, TB screen, and STI testing per ID or OB; repeat STI testing in 3rd trimester  Growth assessment q4 weeks - MFM to facilitate  Viral load (VL) monitoring:  Monitor monthly until undetectable - last lab 6/18 detected <20  New labs ordered today.  If VL undetectable, monitor every trimester, at 36 weeks, and upon admission to L&D  Monitor CD4 count every trimester if < 300  Delivery: see original consult for full details and recs surrounding delivery and postpartum care  If VL is > 1000 copies/mL prior to 38 weeks', perform pre-labor C/S at 38 0/7 wga to reduce risk of vertical transmission - discussed this as a possibility. She has notably had 3 prior vaginal deliveries with pelvis proven to at least 7#.    F/u in 4 weeks for [facility] visit  F/u in 4 weeks for US

## 2025-07-24 NOTE — TELEPHONE ENCOUNTER
Spoke with patient regarding new dx of gHTN.  Recommend delivery during 37th week.  She will think about the dates and let me know.   Will also confirm presentation prior to scheduling (IOL vs ECV).

## 2025-07-24 NOTE — TELEPHONE ENCOUNTER
----- Message from Med Assistant Capri sent at 7/24/2025  2:45 PM CDT -----    ----- Message -----  From: Moi Grey MD  Sent: 7/24/2025   2:37 PM CDT  To: Pillo Lay    Hi, this patient also meets criteria for gHTN today, please review delivery timing with her in the upcoming visit.    Moi James MD  Obstetrics and Gynecology- PGY3

## 2025-07-26 ENCOUNTER — TELEPHONE (OUTPATIENT)
Dept: OBSTETRICS AND GYNECOLOGY | Facility: HOSPITAL | Age: 35
End: 2025-07-26
Payer: MEDICAID

## 2025-07-26 DIAGNOSIS — O98.712 HIV DISEASE AFFECTING PREGNANCY IN SECOND TRIMESTER: ICD-10-CM

## 2025-07-26 DIAGNOSIS — O13.3 GESTATIONAL HYPERTENSION, THIRD TRIMESTER: ICD-10-CM

## 2025-07-26 DIAGNOSIS — O99.019 ANEMIA AFFECTING PREGNANCY, ANTEPARTUM: ICD-10-CM

## 2025-07-26 DIAGNOSIS — O09.90 HIGH RISK PREGNANCY, ANTEPARTUM: Primary | ICD-10-CM

## 2025-07-26 NOTE — TELEPHONE ENCOUNTER
----- Message from Med Assistant Capri sent at 7/25/2025  3:17 PM CDT -----  Regarding: schedule  Patient stated Sunday is a good day .. What time did you want her to come in for ?

## 2025-07-26 NOTE — TELEPHONE ENCOUNTER
Spoke with patient over the phone - IOL scheduled 7/27 at [facility].  All questions answered.

## 2025-07-27 ENCOUNTER — HOSPITAL ENCOUNTER (INPATIENT)
Facility: OTHER | Age: 35
LOS: 1 days | Discharge: HOME | DRG: 832 | End: 2025-07-27
Attending: OBSTETRICS & GYNECOLOGY | Admitting: STUDENT IN AN ORGANIZED HEALTH CARE EDUCATION/TRAINING PROGRAM
Payer: COMMERCIAL

## 2025-07-27 ENCOUNTER — PATIENT MESSAGE (OUTPATIENT)
Dept: OBSTETRICS AND GYNECOLOGY | Facility: CLINIC | Age: 35
End: 2025-07-27
Payer: COMMERCIAL

## 2025-07-27 ENCOUNTER — ANESTHESIA (OUTPATIENT)
Dept: OBSTETRICS AND GYNECOLOGY | Facility: OTHER | Age: 35
End: 2025-07-27
Payer: COMMERCIAL

## 2025-07-27 ENCOUNTER — ANESTHESIA EVENT (OUTPATIENT)
Dept: OBSTETRICS AND GYNECOLOGY | Facility: OTHER | Age: 35
End: 2025-07-27
Payer: COMMERCIAL

## 2025-07-27 VITALS
SYSTOLIC BLOOD PRESSURE: 131 MMHG | RESPIRATION RATE: 18 BRPM | TEMPERATURE: 98 F | HEART RATE: 70 BPM | OXYGEN SATURATION: 100 % | DIASTOLIC BLOOD PRESSURE: 84 MMHG

## 2025-07-27 DIAGNOSIS — O98.713 HIV DISEASE AFFECTING PREGNANCY IN THIRD TRIMESTER: ICD-10-CM

## 2025-07-27 DIAGNOSIS — Z34.90 ENCOUNTER FOR ELECTIVE INDUCTION OF LABOR: Primary | ICD-10-CM

## 2025-07-27 DIAGNOSIS — Z34.90 ENCOUNTER FOR INDUCTION OF LABOR: Primary | ICD-10-CM

## 2025-07-27 DIAGNOSIS — Z21 ASYMPTOMATIC HIV INFECTION, WITH NO HISTORY OF HIV-RELATED ILLNESS: ICD-10-CM

## 2025-07-27 DIAGNOSIS — O13.3 GESTATIONAL HYPERTENSION, THIRD TRIMESTER: ICD-10-CM

## 2025-07-27 LAB
ABSOLUTE EOSINOPHIL (OHS): 0.02 K/UL
ABSOLUTE MONOCYTE (OHS): 0.63 K/UL (ref 0.3–1)
ABSOLUTE NEUTROPHIL COUNT (OHS): 5.28 K/UL (ref 1.8–7.7)
ALBUMIN SERPL BCP-MCNC: 2.6 G/DL (ref 3.5–5.2)
ALP SERPL-CCNC: 138 UNIT/L (ref 40–150)
ALT SERPL W/O P-5'-P-CCNC: <8 UNIT/L (ref 10–44)
ANION GAP (OHS): 7 MMOL/L (ref 8–16)
AST SERPL-CCNC: 17 UNIT/L (ref 11–45)
BASOPHILS # BLD AUTO: 0.01 K/UL
BASOPHILS NFR BLD AUTO: 0.1 %
BILIRUB SERPL-MCNC: 0.3 MG/DL (ref 0.1–1)
BUN SERPL-MCNC: 8 MG/DL (ref 6–20)
CALCIUM SERPL-MCNC: 8.5 MG/DL (ref 8.7–10.5)
CHLORIDE SERPL-SCNC: 109 MMOL/L (ref 95–110)
CO2 SERPL-SCNC: 19 MMOL/L (ref 23–29)
CREAT SERPL-MCNC: 0.6 MG/DL (ref 0.5–1.4)
ERYTHROCYTE [DISTWIDTH] IN BLOOD BY AUTOMATED COUNT: 15.2 % (ref 11.5–14.5)
GFR SERPLBLD CREATININE-BSD FMLA CKD-EPI: >60 ML/MIN/1.73/M2
GLUCOSE SERPL-MCNC: 88 MG/DL (ref 70–110)
GROUP & RH: NORMAL
HCT VFR BLD AUTO: 25.4 % (ref 37–48.5)
HGB BLD-MCNC: 8.3 GM/DL (ref 12–16)
IMM GRANULOCYTES # BLD AUTO: 0.02 K/UL (ref 0–0.04)
IMM GRANULOCYTES NFR BLD AUTO: 0.3 % (ref 0–0.5)
INDIRECT COOMBS: NORMAL
LYMPHOCYTES # BLD AUTO: 1.7 K/UL (ref 1–4.8)
MCH RBC QN AUTO: 26.9 PG (ref 27–31)
MCHC RBC AUTO-ENTMCNC: 32.7 G/DL (ref 32–36)
MCV RBC AUTO: 82 FL (ref 82–98)
NUCLEATED RBC (/100WBC) (OHS): 0 /100 WBC
PLATELET # BLD AUTO: 263 K/UL (ref 150–450)
PMV BLD AUTO: 10.6 FL (ref 9.2–12.9)
POTASSIUM SERPL-SCNC: 3.7 MMOL/L (ref 3.5–5.1)
PROT SERPL-MCNC: 6.6 GM/DL (ref 6–8.4)
RBC # BLD AUTO: 3.09 M/UL (ref 4–5.4)
RELATIVE EOSINOPHIL (OHS): 0.3 %
RELATIVE LYMPHOCYTE (OHS): 22.2 % (ref 18–48)
RELATIVE MONOCYTE (OHS): 8.2 % (ref 4–15)
RELATIVE NEUTROPHIL (OHS): 68.9 % (ref 38–73)
SODIUM SERPL-SCNC: 135 MMOL/L (ref 136–145)
T PALLIDUM IGG+IGM SER QL: NORMAL
WBC # BLD AUTO: 7.66 K/UL (ref 3.9–12.7)

## 2025-07-27 PROCEDURE — 87653 STREP B DNA AMP PROBE: CPT

## 2025-07-27 PROCEDURE — 86900 BLOOD TYPING SEROLOGIC ABO: CPT

## 2025-07-27 PROCEDURE — 85025 COMPLETE CBC W/AUTO DIFF WBC: CPT

## 2025-07-27 PROCEDURE — 86361 T CELL ABSOLUTE COUNT: CPT

## 2025-07-27 PROCEDURE — 86593 SYPHILIS TEST NON-TREP QUANT: CPT

## 2025-07-27 PROCEDURE — 11000001 HC ACUTE MED/SURG PRIVATE ROOM

## 2025-07-27 PROCEDURE — 80053 COMPREHEN METABOLIC PANEL: CPT

## 2025-07-27 PROCEDURE — 87536 HIV-1 QUANT&REVRSE TRNSCRPJ: CPT

## 2025-07-27 RX ORDER — SIMETHICONE 80 MG
1 TABLET,CHEWABLE ORAL 4 TIMES DAILY PRN
Status: DISCONTINUED | OUTPATIENT
Start: 2025-07-27 | End: 2025-07-27 | Stop reason: HOSPADM

## 2025-07-27 RX ORDER — OXYTOCIN-SODIUM CHLORIDE 0.9% IV SOLN 30 UNIT/500ML 30-0.9/5 UT/ML-%
95 SOLUTION INTRAVENOUS ONCE AS NEEDED
Status: CANCELLED | OUTPATIENT
Start: 2025-07-27 | End: 2036-12-22

## 2025-07-27 RX ORDER — OXYTOCIN-SODIUM CHLORIDE 0.9% IV SOLN 30 UNIT/500ML 30-0.9/5 UT/ML-%
10 SOLUTION INTRAVENOUS ONCE AS NEEDED
Status: CANCELLED | OUTPATIENT
Start: 2025-07-27 | End: 2036-12-22

## 2025-07-27 RX ORDER — TRANEXAMIC ACID 10 MG/ML
1000 INJECTION, SOLUTION INTRAVENOUS EVERY 30 MIN PRN
Status: CANCELLED | OUTPATIENT
Start: 2025-07-27

## 2025-07-27 RX ORDER — CALCIUM CARBONATE 200(500)MG
500 TABLET,CHEWABLE ORAL 3 TIMES DAILY PRN
Status: DISCONTINUED | OUTPATIENT
Start: 2025-07-27 | End: 2025-07-27 | Stop reason: HOSPADM

## 2025-07-27 RX ORDER — CARBOPROST TROMETHAMINE 250 UG/ML
250 INJECTION, SOLUTION INTRAMUSCULAR
Status: CANCELLED | OUTPATIENT
Start: 2025-07-27

## 2025-07-27 RX ORDER — MISOPROSTOL 200 UG/1
800 TABLET ORAL ONCE AS NEEDED
Status: CANCELLED | OUTPATIENT
Start: 2025-07-27 | End: 2036-12-22

## 2025-07-27 RX ORDER — MISOPROSTOL 200 UG/1
800 TABLET ORAL ONCE AS NEEDED
Status: CANCELLED | OUTPATIENT
Start: 2025-07-27

## 2025-07-27 RX ORDER — OXYTOCIN-SODIUM CHLORIDE 0.9% IV SOLN 30 UNIT/500ML 30-0.9/5 UT/ML-%
95 SOLUTION INTRAVENOUS CONTINUOUS PRN
Status: CANCELLED | OUTPATIENT
Start: 2025-07-27

## 2025-07-27 RX ORDER — CEFAZOLIN 2 G/1
2 INJECTION, POWDER, FOR SOLUTION INTRAMUSCULAR; INTRAVENOUS ONCE AS NEEDED
Status: DISCONTINUED | OUTPATIENT
Start: 2025-07-27 | End: 2025-07-27 | Stop reason: HOSPADM

## 2025-07-27 RX ORDER — DIPHENOXYLATE HYDROCHLORIDE AND ATROPINE SULFATE 2.5; .025 MG/1; MG/1
2 TABLET ORAL EVERY 6 HOURS PRN
Status: CANCELLED | OUTPATIENT
Start: 2025-07-27

## 2025-07-27 RX ORDER — LAMIVUDINE 150 MG/1
150 TABLET, FILM COATED ORAL EVERY 12 HOURS
Status: DISCONTINUED | OUTPATIENT
Start: 2025-07-27 | End: 2025-07-27 | Stop reason: HOSPADM

## 2025-07-27 RX ORDER — OXYTOCIN 10 [USP'U]/ML
10 INJECTION, SOLUTION INTRAMUSCULAR; INTRAVENOUS ONCE AS NEEDED
Status: CANCELLED | OUTPATIENT
Start: 2025-07-27 | End: 2036-12-22

## 2025-07-27 RX ORDER — SODIUM CHLORIDE, SODIUM LACTATE, POTASSIUM CHLORIDE, CALCIUM CHLORIDE 600; 310; 30; 20 MG/100ML; MG/100ML; MG/100ML; MG/100ML
INJECTION, SOLUTION INTRAVENOUS CONTINUOUS
Status: DISCONTINUED | OUTPATIENT
Start: 2025-07-27 | End: 2025-07-27 | Stop reason: HOSPADM

## 2025-07-27 RX ORDER — SODIUM CHLORIDE 9 MG/ML
INJECTION, SOLUTION INTRAVENOUS
Status: DISCONTINUED | OUTPATIENT
Start: 2025-07-27 | End: 2025-07-27 | Stop reason: HOSPADM

## 2025-07-27 RX ORDER — ONDANSETRON 8 MG/1
8 TABLET, ORALLY DISINTEGRATING ORAL EVERY 8 HOURS PRN
Status: DISCONTINUED | OUTPATIENT
Start: 2025-07-27 | End: 2025-07-27 | Stop reason: HOSPADM

## 2025-07-27 RX ORDER — METHYLERGONOVINE MALEATE 0.2 MG/ML
200 INJECTION INTRAVENOUS ONCE AS NEEDED
Status: CANCELLED | OUTPATIENT
Start: 2025-07-27 | End: 2036-12-22

## 2025-07-27 RX ORDER — LIDOCAINE HYDROCHLORIDE 10 MG/ML
10 INJECTION, SOLUTION INFILTRATION; PERINEURAL ONCE AS NEEDED
Status: CANCELLED | OUTPATIENT
Start: 2025-07-27 | End: 2036-12-22

## 2025-07-27 NOTE — DISCHARGE SUMMARY
Discharge Summary  Gynecology      Admit Date: 2025    Discharge Date and Time: 2025     Attending Physician: Shaila Ferro MD    Principal Diagnoses: Encounter for induction of labor    Active Hospital Problems    Diagnosis  POA    *Encounter for induction of labor [Z34.90]  Not Applicable    Gestational hypertension, third trimester [O13.3]  Yes    Iron deficiency anemia [D50.9]  Yes    HIV disease affecting pregnancy in third trimester [O98.713]  Yes    Obesity, Class I, BMI 30-34.9 [E66.811]  Yes     Chronic      Resolved Hospital Problems   No resolved problems to display.       Procedures: * No surgery found *    Discharged Condition: good    Hospital Course:   Yvrose Dye is a 35 y.o. y.o.  female who presented on 2025 for IOL at 37w0d  [facility]. Of note, patient also has HIV and did not have recent HIV Viral Load to determine eligibility for vaginal delivery. Repeat HIV Viral load will take several days to result per Lab. Re-assuring maternal & fetal status confirmed. HIV Viral load, CD4 count, GBS, PreE, and prenatal labs collected. See Progress note by myself dated 2025 for full details. Patient was then discharged home in good condition with plan to re-scheduled IOL.     Consults: None    Significant Diagnostic Studies:  Recent Labs   Lab 25  0132   WBC 7.66   HGB 8.3*   HCT 25.4*      BUN 8   CREATININE 0.6   ALT <8*   AST 17         Treatments:   1. Labs drawn    Disposition: Home or Self Care    Patient Instructions:   Discharge Medication List as of 2025  3:32 AM        CONTINUE these medications which have NOT CHANGED    Details   docusate sodium (COLACE) 100 MG capsule Take 1 capsule (100 mg total) by mouth 2 (two) times daily., Starting Thu 7/3/2025, Until Fri 7/3/2026, Normal      dolutegravir-lamivudine (DOVATO)  mg Tab Take 1 tablet by mouth once daily., Starting Wed 2024, Normal      PNV no.153/FA/om3/dha/epa/fish (PRENATAL GUMMIES  ORAL) Take by mouth., Historical Med             No discharge procedures on file.         Meeta Looney MD  OB/GYN PGY-3

## 2025-07-27 NOTE — PROGRESS NOTES
After discussion with MFM on call - will need viral load prior to IOL    Viral load has been ordered and is pending. Viral load to be run on Monday and takes 24 hours to result. Will plan for IOL 7/31/25 at 1600    Cinthya Delgado MD  Obstetrics and Gynecology - PGY3

## 2025-07-27 NOTE — PROGRESS NOTES
Patient with known HIV presenting for IOL @ 37w0d 2/2 newly diagnosed gHTN. Patient was last seen by OB NP at 33w4d (7/03) and OB MD at 31w3d (6/18). Last HIV viral load was drawn 6 weeks prior to presentation (6/18), with Viral Load <20 but detectable. At initial MFM consult visit (4/02) patient reported she often misses doses of HAART (Dovato), approximately 2-3 missed doses per week. At 33w4d PORFIRIO visit, patient had mild range /81. At most recent MFM visit 7/24, patient again had mild range BP readings, 143/83 & 149/84, thus meeting criteria for gHTN. With new diaagnosis of gHTN, delivery indicated starting at 37w0d. At MFM visit 7/24, repeat HIV Viral Load was ordered but patient did not present to lab to have drawn. Patient was subsequently scheduled for IOL at 37w0d (7/27 0000).     Per [facility] & ACOG guidelines, repeat CD4+ and quantitative plasma viral load should be obtained every 4 weeks until viral load is undetectable. If the CD4+ count or HIV viral load is fluctuating or if noncompliance is suspected, these labs are indicated more frequently. For delivery planning, to be a candidate for a vaginal delivery, patients must have HIV viral load <1,000 copies/mL for at least 2 checks and compliant with ART. Indication for a prophylactic CD include HIV viral load >=1,000 copies/ mL, unknown HIV viral load, untreated disease or suspected/known poor medication adherence. Given patients last HIV viral load was drawn 6 weeks ago with known record of medication non-compliance, decision made to repeat HIV viral load prior to beginning IOL. I contacted the Lab who informed me that the HIV Viral Load PCR is only processed Monday-Friday and takes multiple hours to result once processing has begun. Discussed case with on call MFM Fellow. Agreed that patient must have updated HIV viral load prior to being cleared for attempt at vaginal delivery.     I then presented to patient bedside to discuss in person. She  denied CTX, LOF, VB, and endorses good fetal movement. She denied HA, vision changes, CP, SOB, & RUQ pain.  She reports she does miss 2-3 doses per week of prescribed ART.     Temp:  [98.1 °F (36.7 °C)] 98.1 °F (36.7 °C)  Pulse:  [70-82] 70  Resp:  [18] 18  SpO2:  [100 %] 100 %  BP: (131)/(84) 131/84    PE:  Gen: in NAD  Resp: normal work of breathing   Abdomen: gravid  SVE: 1/50/-3 @ 0130 07/27/2025     FHT: 140bpm, mod BTBV, + accels, - decels, R&R  TOCO: no CTX    Labs:  Recent Labs   Lab 07/27/25 0132   WBC 7.66   HGB 8.3*   HCT 25.4*      BUN 8   CREATININE 0.6   ALT <8*   AST 17       A/P:  - VSS & WNL, BP normotensive   - PE as above  - NST R&R  - TOCO quiet   - Patient asymptomatic on presentation   - PreE labs (CBC & CMP) WNL w/ exception of anemia   - Anemia similar to baseline, low suspicion for hemolysis  - Discussed that updated HIV viral load is needed prior to starting IOL given last lab draw was 6 weeks ago with known medication non-compliance.   - Discussed that lab will not start processing viral load until Monday AM 7/28  - Discussed recommendation for patient to hold off on IOL until updated HIV viral load has resulted, patient in agreement   - T&S, Trep drawn  - GBS collected  - HIV viral load & CD4 count collected   - Once viral load has resulted, will re-schedule IOL   - MFM Fellow on call, OB staff on call, and patient primary OB made aware of plan        Meeta Looney MD  OB/GYN PGY-3

## 2025-07-27 NOTE — ANESTHESIA PREPROCEDURE EVALUATION
[facility]  Anesthesia Pre-Operative Evaluation         Patient Name: Yvrose Dye  YOB: 1990  MRN: [id number]    2025      Yvrose Dye is a 35 y.o. female  @ 37w0d who presents for IOL. C/b hx of seizures, anemia, gHTN, HIV. No hx of bleeding disorders, clotting disorders, back problems, blood thinner use, or asthma. Remote hx of seizures, last one was during first pregnancy, not on maintenance.       OB History    Para Term  AB Living   4 3 3   3   SAB IAB Ectopic Multiple Live Births      1 3      # Outcome Date GA Lbr Aguila/2nd Weight Sex Type Anes PTL Lv   4A             4B Current            3 Term 07/02/15 40w0d  3.402 kg (7 lb 8 oz) F Vag-Spont  N NAHUM   2 Term 14 40w0d  2.863 kg (6 lb 5 oz) M Vag-Spont  N NAHUM   1 Term 06/15/10 40w0d  3.317 kg (7 lb 5 oz) F Vag-Spont  N NAHUM       Review of patient's allergies indicates:  No Known Allergies    Wt Readings from Last 1 Encounters:   25 1334 111.8 kg (246 lb 9.4 oz)       BP Readings from Last 3 Encounters:   25 (!) 149/84   25 (!) 142/81   25 133/82       Problem List[1]    No past surgical history on file.    Social History[2]      Chemistry        Component Value Date/Time     2025 0915     2023 1546    K 3.7 2025 0915    K 4.1 2023 1546     2025 0915     2023 1546    CO2 20 (L) 2025 0915    CO2 21 (L) 2023 1546    BUN 5 (L) 2025 0915    CREATININE 0.6 2025 0915    GLU 88 2025 0915    GLU 87 2023 1546        Component Value Date/Time    CALCIUM 8.6 (L) 2025 0915    CALCIUM 9.3 2023 1546    ALKPHOS 50 2025 0915    ALKPHOS 50 (L) 2023 1546    AST 12 2025 0915    AST 22 2023 1546    ALT 7 (L) 2025 0915    ALT 11 2023 1546    BILITOT 0.2 2025 0915    BILITOT 0.4 2023 1546    ESTGFRAFRICA >60.0 10/21/2021 0849     "EGFRNONAA >60.0 10/21/2021 0849            Lab Results   Component Value Date    WBC 7.24 06/18/2025    HGB 8.4 (L) 06/18/2025    HCT 26.0 (L) 06/18/2025    MCV 84 06/18/2025     06/18/2025       No results for input(s): "PT", "INR", "PROTIME", "APTT" in the last 72 hours.          Pre-op Assessment    I have reviewed the Patient Summary Reports.     I have reviewed the Nursing Notes. I have reviewed the NPO Status.   I have reviewed the Medications.     Review of Systems  Anesthesia Hx:               Denies Personal Hx of Anesthesia complications.                        Physical Exam  General: Well nourished, Cooperative, Alert and Oriented    Airway:  Mallampati: II   Tongue: Normal    Dental:  Periodontal disease        Anesthesia Plan  Type of Anesthesia, risks & benefits discussed:    Anesthesia Type: Gen ETT, Epidural, Spinal, CSE  Intra-op Monitoring Plan: Standard ASA Monitors  Post Op Pain Control Plan: multimodal analgesia and IV/PO Opioids PRN  Induction:  IV  Airway Plan: Direct and Video  Informed Consent: Informed consent signed with the Patient and all parties understand the risks and agree with anesthesia plan.  All questions answered. Patient consented to blood products? Yes  ASA Score: 3  Day of Surgery Review of History & Physical: H&P Update referred to the surgeon/provider.    Ready For Surgery From Anesthesia Perspective.     .           [1]   Patient Active Problem List  Diagnosis    HIV infection    Seizure    Headaches, cluster    Obesity, Class I, BMI 30-34.9    HIV disease affecting pregnancy in third trimester    Iron deficiency anemia    Encounter for induction of labor    Gestational hypertension, third trimester   [2]   Social History  Socioeconomic History    Marital status: Significant Other   Tobacco Use    Smoking status: Former     Types: Vaping with nicotine    Smokeless tobacco: Never   Vaping Use    Vaping status: Never Used   Substance and Sexual Activity    Alcohol " use: Not Currently    Drug use: Not Currently     Types: Marijuana     Comment: last used 12/2024    Sexual activity: Yes     Partners: Male

## 2025-07-28 ENCOUNTER — TELEPHONE (OUTPATIENT)
Dept: OBSTETRICS AND GYNECOLOGY | Facility: CLINIC | Age: 35
End: 2025-07-28
Payer: COMMERCIAL

## 2025-07-28 LAB
CD3+CD4+ CELLS # SPEC: 673 CELLS/UL (ref 300–1400)
CD3+CD4+ CELLS NFR BLD: 28.78 % (ref 28–57)
GROUP B STREPTOCOCCUS, PCR (OHS): NEGATIVE
HIV1 RNA # SERPL NAA+PROBE: <20 COPIES/ML
HIV1 RNA SERPL NAA+PROBE-LOG#: <1.3 LOG COPIES/ML
HIV1 RNA SERPL NAA+PROBE-LOG#: DETECTED {LOG_COPIES}/ML
LABORATORY COMMENT REPORT: NORMAL

## 2025-07-28 NOTE — TELEPHONE ENCOUNTER
Called patient to let her know that they needed  more blood work and it is still pending and  will give her a call tomorrow to discuss new date

## 2025-07-29 ENCOUNTER — HOSPITAL ENCOUNTER (INPATIENT)
Facility: OTHER | Age: 35
LOS: 3 days | Discharge: HOME | End: 2025-08-01
Attending: STUDENT IN AN ORGANIZED HEALTH CARE EDUCATION/TRAINING PROGRAM | Admitting: OBSTETRICS & GYNECOLOGY
Payer: COMMERCIAL

## 2025-07-29 ENCOUNTER — PATIENT MESSAGE (OUTPATIENT)
Dept: OBSTETRICS AND GYNECOLOGY | Facility: OTHER | Age: 35
End: 2025-07-29
Payer: COMMERCIAL

## 2025-07-29 DIAGNOSIS — Z34.90 ENCOUNTER FOR INDUCTION OF LABOR: ICD-10-CM

## 2025-07-29 DIAGNOSIS — Z34.90 ENCOUNTER FOR ELECTIVE INDUCTION OF LABOR: ICD-10-CM

## 2025-07-29 PROBLEM — G44.009 HEADACHES, CLUSTER: Chronic | Status: RESOLVED | Noted: 2023-10-06 | Resolved: 2025-07-29

## 2025-07-29 LAB
ABO + RH BLD: ABNORMAL
ABSOLUTE EOSINOPHIL (OHS): 0.04 K/UL
ABSOLUTE MONOCYTE (OHS): 0.71 K/UL (ref 0.3–1)
ABSOLUTE NEUTROPHIL COUNT (OHS): 5.65 K/UL (ref 1.8–7.7)
ALBUMIN SERPL BCP-MCNC: 2.8 G/DL (ref 3.5–5.2)
ALP SERPL-CCNC: 161 UNIT/L (ref 40–150)
ALT SERPL W/O P-5'-P-CCNC: <8 UNIT/L (ref 10–44)
ANION GAP (OHS): 8 MMOL/L (ref 8–16)
AST SERPL-CCNC: 20 UNIT/L (ref 11–45)
BASOPHILS # BLD AUTO: 0.01 K/UL
BASOPHILS NFR BLD AUTO: 0.1 %
BILIRUB SERPL-MCNC: 0.3 MG/DL (ref 0.1–1)
BLD GP AB SCN CELLS X3 SERPL QL: ABNORMAL
BLOOD GROUP ANTIBODIES SERPL: NORMAL
BUN SERPL-MCNC: 4 MG/DL (ref 6–20)
CALCIUM SERPL-MCNC: 8.6 MG/DL (ref 8.7–10.5)
CHLORIDE SERPL-SCNC: 107 MMOL/L (ref 95–110)
CO2 SERPL-SCNC: 19 MMOL/L (ref 23–29)
CREAT SERPL-MCNC: 0.6 MG/DL (ref 0.5–1.4)
CREAT UR-MCNC: 81.5 MG/DL (ref 15–325)
ERYTHROCYTE [DISTWIDTH] IN BLOOD BY AUTOMATED COUNT: 15.2 % (ref 11.5–14.5)
GFR SERPLBLD CREATININE-BSD FMLA CKD-EPI: >60 ML/MIN/1.73/M2
GLUCOSE SERPL-MCNC: 76 MG/DL (ref 70–110)
HCT VFR BLD AUTO: 26.8 % (ref 37–48.5)
HGB BLD-MCNC: 8.4 GM/DL (ref 12–16)
IMM GRANULOCYTES # BLD AUTO: 0.04 K/UL (ref 0–0.04)
IMM GRANULOCYTES NFR BLD AUTO: 0.5 % (ref 0–0.5)
LYMPHOCYTES # BLD AUTO: 1.56 K/UL (ref 1–4.8)
MCH RBC QN AUTO: 26.3 PG (ref 27–31)
MCHC RBC AUTO-ENTMCNC: 31.3 G/DL (ref 32–36)
MCV RBC AUTO: 84 FL (ref 82–98)
NUCLEATED RBC (/100WBC) (OHS): 0 /100 WBC
PLATELET # BLD AUTO: 249 K/UL (ref 150–450)
PLATELET BLD QL SMEAR: NORMAL
PMV BLD AUTO: 10.9 FL (ref 9.2–12.9)
POTASSIUM SERPL-SCNC: 3.7 MMOL/L (ref 3.5–5.1)
PROT SERPL-MCNC: 7 GM/DL (ref 6–8.4)
PROT UR-MCNC: 12 MG/DL
PROT/CREAT UR: 0.15 MG/G{CREAT}
RBC # BLD AUTO: 3.2 M/UL (ref 4–5.4)
RELATIVE EOSINOPHIL (OHS): 0.5 %
RELATIVE LYMPHOCYTE (OHS): 19.5 % (ref 18–48)
RELATIVE MONOCYTE (OHS): 8.9 % (ref 4–15)
RELATIVE NEUTROPHIL (OHS): 70.5 % (ref 38–73)
SODIUM SERPL-SCNC: 134 MMOL/L (ref 136–145)
SPECIMEN OUTDATE: ABNORMAL
T PALLIDUM IGG+IGM SER QL: NORMAL
WBC # BLD AUTO: 8.01 K/UL (ref 3.9–12.7)

## 2025-07-29 PROCEDURE — 25000003 PHARM REV CODE 250

## 2025-07-29 PROCEDURE — 84156 ASSAY OF PROTEIN URINE: CPT

## 2025-07-29 PROCEDURE — 85025 COMPLETE CBC W/AUTO DIFF WBC: CPT

## 2025-07-29 PROCEDURE — 27200710 HC EPIDURAL INFUSION PUMP SET: Performed by: ANESTHESIOLOGY

## 2025-07-29 PROCEDURE — 72100002 HC LABOR CARE, 1ST 8 HOURS

## 2025-07-29 PROCEDURE — 51702 INSERT TEMP BLADDER CATH: CPT

## 2025-07-29 PROCEDURE — 63600175 PHARM REV CODE 636 W HCPCS

## 2025-07-29 PROCEDURE — 80053 COMPREHEN METABOLIC PANEL: CPT

## 2025-07-29 PROCEDURE — 86900 BLOOD TYPING SEROLOGIC ABO: CPT

## 2025-07-29 PROCEDURE — 62326 NJX INTERLAMINAR LMBR/SAC: CPT

## 2025-07-29 PROCEDURE — 86870 RBC ANTIBODY IDENTIFICATION: CPT

## 2025-07-29 PROCEDURE — C1751 CATH, INF, PER/CENT/MIDLINE: HCPCS | Performed by: ANESTHESIOLOGY

## 2025-07-29 PROCEDURE — 86593 SYPHILIS TEST NON-TREP QUANT: CPT

## 2025-07-29 PROCEDURE — 11000001 HC ACUTE MED/SURG PRIVATE ROOM

## 2025-07-29 RX ORDER — DOCUSATE SODIUM 100 MG/1
100 CAPSULE, LIQUID FILLED ORAL 2 TIMES DAILY
Status: DISCONTINUED | OUTPATIENT
Start: 2025-07-29 | End: 2025-07-30

## 2025-07-29 RX ORDER — LAMIVUDINE 150 MG/1
300 TABLET, FILM COATED ORAL DAILY
Status: DISCONTINUED | OUTPATIENT
Start: 2025-07-30 | End: 2025-08-01 | Stop reason: HOSPADM

## 2025-07-29 RX ORDER — SODIUM CHLORIDE, SODIUM LACTATE, POTASSIUM CHLORIDE, CALCIUM CHLORIDE 600; 310; 30; 20 MG/100ML; MG/100ML; MG/100ML; MG/100ML
INJECTION, SOLUTION INTRAVENOUS CONTINUOUS
Status: DISCONTINUED | OUTPATIENT
Start: 2025-07-29 | End: 2025-07-30

## 2025-07-29 RX ORDER — SODIUM CHLORIDE 0.9 % (FLUSH) 0.9 %
2 SYRINGE (ML) INJECTION
Status: DISCONTINUED | OUTPATIENT
Start: 2025-07-29 | End: 2025-07-30

## 2025-07-29 RX ORDER — CALCIUM CARBONATE 200(500)MG
500 TABLET,CHEWABLE ORAL 3 TIMES DAILY PRN
Status: DISCONTINUED | OUTPATIENT
Start: 2025-07-29 | End: 2025-07-30

## 2025-07-29 RX ORDER — CEFAZOLIN 2 G/1
2 INJECTION, POWDER, FOR SOLUTION INTRAMUSCULAR; INTRAVENOUS ONCE AS NEEDED
Status: DISCONTINUED | OUTPATIENT
Start: 2025-07-29 | End: 2025-07-30

## 2025-07-29 RX ORDER — FENTANYL/BUPIVACAINE/NS/PF 2MCG/ML-.1
PLASTIC BAG, INJECTION (ML) INJECTION CONTINUOUS
Refills: 0 | Status: CANCELLED | OUTPATIENT
Start: 2025-07-29

## 2025-07-29 RX ORDER — SODIUM CITRATE AND CITRIC ACID MONOHYDRATE 334; 500 MG/5ML; MG/5ML
30 SOLUTION ORAL ONCE
Status: CANCELLED | OUTPATIENT
Start: 2025-07-29 | End: 2025-07-29

## 2025-07-29 RX ORDER — SIMETHICONE 80 MG
1 TABLET,CHEWABLE ORAL 4 TIMES DAILY PRN
Status: DISCONTINUED | OUTPATIENT
Start: 2025-07-29 | End: 2025-07-30

## 2025-07-29 RX ORDER — ONDANSETRON 8 MG/1
8 TABLET, ORALLY DISINTEGRATING ORAL EVERY 8 HOURS PRN
Status: DISCONTINUED | OUTPATIENT
Start: 2025-07-29 | End: 2025-07-30

## 2025-07-29 RX ORDER — OXYTOCIN-SODIUM CHLORIDE 0.9% IV SOLN 30 UNIT/500ML 30-0.9/5 UT/ML-%
0-32 SOLUTION INTRAVENOUS CONTINUOUS
Status: DISCONTINUED | OUTPATIENT
Start: 2025-07-29 | End: 2025-07-30

## 2025-07-29 RX ORDER — FAMOTIDINE 10 MG/ML
20 INJECTION, SOLUTION INTRAVENOUS ONCE
Status: CANCELLED | OUTPATIENT
Start: 2025-07-29 | End: 2025-07-29

## 2025-07-29 RX ORDER — FENTANYL/BUPIVACAINE/NS/PF 2MCG/ML-.1
PLASTIC BAG, INJECTION (ML) INJECTION CONTINUOUS PRN
Status: DISCONTINUED | OUTPATIENT
Start: 2025-07-29 | End: 2025-07-30

## 2025-07-29 RX ORDER — SODIUM CHLORIDE 9 MG/ML
INJECTION, SOLUTION INTRAVENOUS
Status: DISCONTINUED | OUTPATIENT
Start: 2025-07-29 | End: 2025-07-30

## 2025-07-29 RX ADMIN — SODIUM CHLORIDE, POTASSIUM CHLORIDE, SODIUM LACTATE AND CALCIUM CHLORIDE 500 ML: 600; 310; 30; 20 INJECTION, SOLUTION INTRAVENOUS at 04:07

## 2025-07-29 RX ADMIN — FENTANYL CITRATE 8 ML/HR: 50 INJECTION INTRAMUSCULAR; INTRAVENOUS at 05:07

## 2025-07-29 RX ADMIN — ZIDOVUDINE 2 MG/KG/HR: 10 INJECTION, SOLUTION INTRAVENOUS at 05:07

## 2025-07-29 RX ADMIN — BUPIVACAINE HYDROCHLORIDE 5 ML: 2.5 INJECTION, SOLUTION EPIDURAL; INFILTRATION; INTRACAUDAL; PERINEURAL at 05:07

## 2025-07-29 RX ADMIN — Medication 4 MILLI-UNITS/MIN: at 06:07

## 2025-07-29 RX ADMIN — ZIDOVUDINE 1 MG/KG/HR: 10 INJECTION, SOLUTION INTRAVENOUS at 06:07

## 2025-07-29 NOTE — PROGRESS NOTES
"LABOR NOTE    S:  Complaints: No.  Epidural Working:  yes    Resident to bedside for latif balloon placement and cervical check.     O: BP (!) 150/80   Pulse 70   Temp 98.3 °F (36.8 °C) (Oral)   Resp 18   Ht 5' 4" (1.626 m)   Wt 111.8 kg (246 lb 7.6 oz)   LMP  (LMP Unknown) Comment: pt has nexplanon  SpO2 100%   Breastfeeding No   BMI 42.31 kg/m²     FHT: Baseline 140, mod variability, + accels, - decels, Cat 1 (reassuring)  CTX: None   SVE: 3/60/-2    TIMELINE:   1615: 2/50/-3  1745: 3/60/-2    PLAN:    IOL  Continue Close Maternal/Fetal Monitoring  Pitocin Augmentation per protocol  Recheck 4 hours or PRN  Latif balloon unable to be placed due to cervical dilation     Brie English M.D.  Obstetrics and Gynecology  PGY-1  "

## 2025-07-29 NOTE — H&P
"   HISTORY AND PHYSICAL                                                OBSTETRICS          Subjective:       Yvrose Dye is a 35 y.o.  female with IUP at 37w2d weeks gestation who presents today for scheduled induction. This IUP is complicated by HIV, gHTN, CHILO, and obesity. Last HIV viral load was drawn on , with viral load <20 but detectable. She is currently on Dovato, but throughout pregnancy has missed doses. Reports compliance in the recent weeks.    At [facility] visit , patient had mild range BP readings, 143/83 & 149/84, thus meeting criteria for gHTN. With new diagnosis of gHTN, delivery indicated at 37w0d. At [facility] visit , repeat HIV Viral Load was ordered but patient did not present to lab to have drawn. Patient was subsequently scheduled for IOL at 37w0d (). Unfortunately, no viral load was resulted at that time, so a new viral load was obtained and patient IOL rescheduled for viral load results.     Today, patient denies contractions, denies vaginal bleeding, denies LOF.  Fetal Movement: normal.     PMHx:   Past Medical History:   Diagnosis Date    Human immunodeficiency virus (HIV) disease     Seizures     not had one for 12 years PTA       PSHx: No past surgical history on file.    All: Review of patient's allergies indicates:  No Known Allergies    Meds: Prescriptions Prior to Admission[1]    SH: Social History[2]    FH: No family history on file.    OBHx:   OB History    Para Term  AB Living   4 3 3 0 0 3   SAB IAB Ectopic Multiple Live Births   0 0 0 0 3      # Outcome Date GA Lbr Aguila/2nd Weight Sex Type Anes PTL Lv   4 Current            3 Term 07/02/15 40w0d  3.402 kg (7 lb 8 oz) F Vag-Spont  N NAHUM   2 Term 14 40w0d  2.863 kg (6 lb 5 oz) M Vag-Spont  N NAHUM   1 Term 06/15/10 40w0d  3.317 kg (7 lb 5 oz) F Vag-Spont  N NAHUM       Objective:       BP (!) 143/84   Pulse 74   Temp 98.3 °F (36.8 °C) (Oral)   Resp 18   Ht 5' 4" (1.626 m)   Wt 111.8 kg (246 lb " 7.6 oz)   LMP  (LMP Unknown) Comment: pt has nexplanon  SpO2 99%   Breastfeeding No   BMI 42.31 kg/m²     Vitals:    25 1613 25 1616 25 1620 25 1621   BP: (!) 143/84      Pulse: 72 79 83 74   Resp:       Temp:       TempSrc:       SpO2:  100%  99%   Weight:       Height:         General:   alert, appears stated age, and cooperative   Lungs:   clear to auscultation bilaterally   Heart:   regular rate and rhythm, S1, S2 normal, no murmur, click, rub or gallop   Abdomen:  soft, non-tender; bowel sounds normal; no masses,  no organomegaly   Extremities negative edema, negative erythema   FHT: Baseline 150, mod variability, + accels, - decels, Cat 1 (reassuring)                 TOCO: No   Presentations: cephalic by ultrasound   Cervix:     Dilation: 2cm    Effacement: 50%    Station:  -3    Consistency: medium    Position: N/a     EFW by Leopold's: 7    Lab Review  Blood Type O POS  GBBS: negative  Rubella: Immune  RPR: NR  HIV: positive  HepB: negative     Assessment:     35 y.o.  at 37w2d weeks gestation who presents for IOL    Active Hospital Problems    Diagnosis  POA    Encounter for induction of labor [Z34.90]  Not Applicable    Gestational hypertension, third trimester [O13.3]  Yes    Iron deficiency anemia [D50.9]  Yes    HIV disease affecting pregnancy in third trimester [O98.713]  Yes    Obesity, Class I, BMI 30-34.9 [E66.811]  Yes     Chronic    Seizure [R56.9]  Yes     Chronic    Headaches, cluster [G44.009]  Yes     Chronic    HIV infection [Z21]  Yes      Resolved Hospital Problems   No resolved problems to display.     Plan:     IOL   Risks, benefits, alternatives and possible complications have been discussed in detail with the patient.   - Consents signed and to chart  - Admit to Labor and Delivery unit  - SVE: /3, BSUS vertex  - Patient desire latif balloon placement after Epidural  - Will consider starting cytotec or pit as appropriate  - Epidural per  Anesthesia  - Draw CBC, T&S  - Notify Staff  - Recheck in 4 hrs or PRN    Post-Partum Hemorrhage risk - medium      HIV  - Last viral load (7/27): HIV log <1.30, HIV copies <20, HIV-1 RNA qualitative detected  - Current therapy: Dovato  - ZDV 2mg/kg/hr loading dose followed by ZDV 1mg/kg/hr until delivery  - Continue Dovato inpatient    gHTN  - [facility] 7/24: /83 > 149/84 and met criteria for gHTN  - Delivery indicated starting at 37w0d  - Not requiring anti-hypertensives in the prenatal period  - Moderately elevated BP on admission: 142/73 > 143/84  - Continue to monitor    Iron Deficiency Anemia  - CBC pending  - Previously on Oral Fe with colace   - Previously counseled on need for Fe infusion, but patient denies receiving infusions   - Continue fe/colace inpatient    Seizures, unspecified  - Last seizure 15 years ago  - Reports she had a seizure in the postpartum period of G1. Denies any elevated blood pressures during that pregnancy as far as she remembers.   - Not currently on medication   - Continue to monitor    Brie English M.D.  Obstetrics and Gynecology  PGY-1       [1]   Medications Prior to Admission   Medication Sig Dispense Refill Last Dose/Taking    docusate sodium (COLACE) 100 MG capsule Take 1 capsule (100 mg total) by mouth 2 (two) times daily. (Patient not taking: Reported on 7/24/2025) 60 capsule 1     dolutegravir-lamivudine (DOVATO)  mg Tab Take 1 tablet by mouth once daily. 30 tablet 11     PNV no.153/FA/om3/dha/epa/fish (PRENATAL GUMMIES ORAL) Take by mouth.      [2]   Social History  Socioeconomic History    Marital status: Significant Other   Tobacco Use    Smoking status: Former     Types: Vaping with nicotine    Smokeless tobacco: Never   Vaping Use    Vaping status: Never Used   Substance and Sexual Activity    Alcohol use: Not Currently    Drug use: Not Currently     Types: Marijuana     Comment: last used 12/2024    Sexual activity: Yes     Partners: Male     Social Drivers  of Health     Financial Resource Strain: Low Risk  (7/29/2025)    Overall Financial Resource Strain (CARDIA)     Difficulty of Paying Living Expenses: Not hard at all   Food Insecurity: Food Insecurity Present (7/29/2025)    Hunger Vital Sign     Worried About Running Out of Food in the Last Year: Sometimes true     Ran Out of Food in the Last Year: Sometimes true   Transportation Needs: Unmet Transportation Needs (7/29/2025)    PRAPARE - Transportation     Lack of Transportation (Medical): Yes     Lack of Transportation (Non-Medical): No   Stress: No Stress Concern Present (7/29/2025)    [employer] - Occupational Stress Questionnaire     Feeling of Stress : Only a little   Housing Stability: High Risk (7/29/2025)    Housing Stability Vital Sign     Unable to Pay for Housing in the Last Year: Yes     Number of Times Moved in the Last Year: 2     Homeless in the Last Year: No

## 2025-07-29 NOTE — TELEPHONE ENCOUNTER
Spoke with patient over the phone.  Labs back now - will schedule for IOL today at 4pm.  She did go to work this morning, so does not need a letter.  Discussed latif balloon, epidural per her request.  All questions answered.

## 2025-07-29 NOTE — ANESTHESIA PROCEDURE NOTES
Epidural    Patient location during procedure: OB   Reason for block: primary anesthetic   Reason for block: labor analgesia requested by patient and obstetrician  Diagnosis: IUP   Start time: 7/29/2025 4:54 PM  Timeout: 7/29/2025 4:53 PM  End time: 7/29/2025 5:07 PM  Surgery related to: Vaginal Delivery    Staffing  Performing Provider: Primo Cabrera MD  Authorizing Provider: Kojo Martínez III, MD    Staffing  Performed by: Primo Cabrera MD  Authorized by: Kojo Martínez III, MD        Preanesthetic Checklist  Completed: patient identified, IV checked, site marked, risks and benefits discussed, surgical consent, monitors and equipment checked, pre-op evaluation, timeout performed, anesthesia consent given, hand hygiene performed and patient being monitored  Preparation  Patient position: sitting  Prep: ChloraPrep  Patient monitoring: Pulse Ox  Reason for block: primary anesthetic   Epidural  Skin Anesthetic: lidocaine 1%  Skin Wheal: 3 mL  Administration type: continuous  Approach: midline  Interspace: L3-4    Injection technique: DHEERAJ saline  Needle and Epidural Catheter  Needle type: Tuohy   Needle gauge: 17  Needle length: 3.5 inches  Needle insertion depth: 7 cm  Catheter type: [employer]  Catheter size: 19 G  Catheter at skin depth: 11 cm  Insertion Attempts: 1  Test dose: 3 mL of lidocaine 1.5% with Epi 1-to-200,000  Additional Documentation: incremental injection, negative aspiration for heme and CSF, no paresthesia on injection, no signs/symptoms of IV or SA injection, no significant pain on injection and no significant complaints from patient  Needle localization: anatomical landmarks  Medications:  Volume per aspiration: 5 mL  Time between aspirations: 5 minutes   Assessment  Ease of block: easy  Patient's tolerance of the procedure: comfortable throughout block and no complaints No inadvertent dural puncture with Tuohy.  Dural puncture performed with spinal needle.

## 2025-07-30 PROBLEM — Z34.90 ENCOUNTER FOR INDUCTION OF LABOR: Status: RESOLVED | Noted: 2025-07-27 | Resolved: 2025-07-30

## 2025-07-30 PROCEDURE — 3E033VJ INTRODUCTION OF OTHER HORMONE INTO PERIPHERAL VEIN, PERCUTANEOUS APPROACH: ICD-10-PCS | Performed by: OBSTETRICS & GYNECOLOGY

## 2025-07-30 PROCEDURE — 63600175 PHARM REV CODE 636 W HCPCS

## 2025-07-30 PROCEDURE — 72200005 HC VAGINAL DELIVERY LEVEL II

## 2025-07-30 PROCEDURE — 25000003 PHARM REV CODE 250: Performed by: OBSTETRICS & GYNECOLOGY

## 2025-07-30 PROCEDURE — 25000003 PHARM REV CODE 250

## 2025-07-30 PROCEDURE — 11000001 HC ACUTE MED/SURG PRIVATE ROOM

## 2025-07-30 PROCEDURE — 10907ZC DRAINAGE OF AMNIOTIC FLUID, THERAPEUTIC FROM PRODUCTS OF CONCEPTION, VIA NATURAL OR ARTIFICIAL OPENING: ICD-10-PCS | Performed by: OBSTETRICS & GYNECOLOGY

## 2025-07-30 PROCEDURE — 88307 TISSUE EXAM BY PATHOLOGIST: CPT | Mod: TC

## 2025-07-30 PROCEDURE — 59409 OBSTETRICAL CARE: CPT | Mod: ,,, | Performed by: OBSTETRICS & GYNECOLOGY

## 2025-07-30 RX ORDER — OXYTOCIN-SODIUM CHLORIDE 0.9% IV SOLN 30 UNIT/500ML 30-0.9/5 UT/ML-%
95 SOLUTION INTRAVENOUS ONCE AS NEEDED
Status: DISCONTINUED | OUTPATIENT
Start: 2025-07-30 | End: 2025-08-01 | Stop reason: HOSPADM

## 2025-07-30 RX ORDER — LABETALOL HYDROCHLORIDE 5 MG/ML
20 INJECTION, SOLUTION INTRAVENOUS ONCE
Status: COMPLETED | OUTPATIENT
Start: 2025-07-30 | End: 2025-07-30

## 2025-07-30 RX ORDER — ONDANSETRON 8 MG/1
8 TABLET, ORALLY DISINTEGRATING ORAL EVERY 8 HOURS PRN
Status: DISCONTINUED | OUTPATIENT
Start: 2025-07-30 | End: 2025-08-01 | Stop reason: HOSPADM

## 2025-07-30 RX ORDER — MAGNESIUM SULFATE HEPTAHYDRATE 40 MG/ML
2 INJECTION, SOLUTION INTRAVENOUS CONTINUOUS
Status: DISCONTINUED | OUTPATIENT
Start: 2025-07-30 | End: 2025-07-31

## 2025-07-30 RX ORDER — SODIUM CHLORIDE 0.9 % (FLUSH) 0.9 %
2 SYRINGE (ML) INJECTION
Status: DISCONTINUED | OUTPATIENT
Start: 2025-07-30 | End: 2025-08-01 | Stop reason: HOSPADM

## 2025-07-30 RX ORDER — ENOXAPARIN SODIUM 100 MG/ML
40 INJECTION SUBCUTANEOUS EVERY 12 HOURS
Status: DISCONTINUED | OUTPATIENT
Start: 2025-07-30 | End: 2025-08-01 | Stop reason: HOSPADM

## 2025-07-30 RX ORDER — MISOPROSTOL 200 UG/1
800 TABLET ORAL ONCE AS NEEDED
Status: DISCONTINUED | OUTPATIENT
Start: 2025-07-30 | End: 2025-08-01 | Stop reason: HOSPADM

## 2025-07-30 RX ORDER — DIPHENOXYLATE HYDROCHLORIDE AND ATROPINE SULFATE 2.5; .025 MG/1; MG/1
2 TABLET ORAL EVERY 6 HOURS PRN
Status: DISCONTINUED | OUTPATIENT
Start: 2025-07-30 | End: 2025-07-30

## 2025-07-30 RX ORDER — METHYLERGONOVINE MALEATE 0.2 MG/ML
200 INJECTION INTRAVENOUS ONCE AS NEEDED
Status: DISCONTINUED | OUTPATIENT
Start: 2025-07-30 | End: 2025-07-30

## 2025-07-30 RX ORDER — HYDROCORTISONE 25 MG/G
CREAM TOPICAL 3 TIMES DAILY PRN
Status: DISCONTINUED | OUTPATIENT
Start: 2025-07-30 | End: 2025-08-01 | Stop reason: HOSPADM

## 2025-07-30 RX ORDER — OXYTOCIN-SODIUM CHLORIDE 0.9% IV SOLN 30 UNIT/500ML 30-0.9/5 UT/ML-%
10 SOLUTION INTRAVENOUS ONCE AS NEEDED
Status: DISCONTINUED | OUTPATIENT
Start: 2025-07-30 | End: 2025-07-30

## 2025-07-30 RX ORDER — OXYTOCIN-SODIUM CHLORIDE 0.9% IV SOLN 30 UNIT/500ML 30-0.9/5 UT/ML-%
95 SOLUTION INTRAVENOUS CONTINUOUS PRN
Status: DISCONTINUED | OUTPATIENT
Start: 2025-07-30 | End: 2025-08-01 | Stop reason: HOSPADM

## 2025-07-30 RX ORDER — CARBOPROST TROMETHAMINE 250 UG/ML
250 INJECTION, SOLUTION INTRAMUSCULAR
Status: DISCONTINUED | OUTPATIENT
Start: 2025-07-30 | End: 2025-07-30

## 2025-07-30 RX ORDER — OXYTOCIN-SODIUM CHLORIDE 0.9% IV SOLN 30 UNIT/500ML 30-0.9/5 UT/ML-%
95 SOLUTION INTRAVENOUS ONCE AS NEEDED
Status: DISCONTINUED | OUTPATIENT
Start: 2025-07-30 | End: 2025-07-30

## 2025-07-30 RX ORDER — HYDROCODONE BITARTRATE AND ACETAMINOPHEN 10; 325 MG/1; MG/1
1 TABLET ORAL EVERY 4 HOURS PRN
Status: DISCONTINUED | OUTPATIENT
Start: 2025-07-30 | End: 2025-08-01 | Stop reason: HOSPADM

## 2025-07-30 RX ORDER — LIDOCAINE HYDROCHLORIDE 10 MG/ML
10 INJECTION, SOLUTION INFILTRATION; PERINEURAL ONCE AS NEEDED
Status: DISCONTINUED | OUTPATIENT
Start: 2025-07-30 | End: 2025-07-30

## 2025-07-30 RX ORDER — DIPHENOXYLATE HYDROCHLORIDE AND ATROPINE SULFATE 2.5; .025 MG/1; MG/1
2 TABLET ORAL EVERY 6 HOURS PRN
Status: DISCONTINUED | OUTPATIENT
Start: 2025-07-30 | End: 2025-08-01 | Stop reason: HOSPADM

## 2025-07-30 RX ORDER — OXYTOCIN 10 [USP'U]/ML
10 INJECTION, SOLUTION INTRAMUSCULAR; INTRAVENOUS ONCE AS NEEDED
Status: DISCONTINUED | OUTPATIENT
Start: 2025-07-30 | End: 2025-07-30

## 2025-07-30 RX ORDER — MISOPROSTOL 200 UG/1
800 TABLET ORAL ONCE AS NEEDED
Status: DISCONTINUED | OUTPATIENT
Start: 2025-07-30 | End: 2025-07-30

## 2025-07-30 RX ORDER — LABETALOL HYDROCHLORIDE 5 MG/ML
INJECTION, SOLUTION INTRAVENOUS
Status: COMPLETED
Start: 2025-07-30 | End: 2025-07-30

## 2025-07-30 RX ORDER — PRENATAL WITH FERROUS FUM AND FOLIC ACID 3080; 920; 120; 400; 22; 1.84; 3; 20; 10; 1; 12; 200; 27; 25; 2 [IU]/1; [IU]/1; MG/1; [IU]/1; MG/1; MG/1; MG/1; MG/1; MG/1; MG/1; UG/1; MG/1; MG/1; MG/1; MG/1
1 TABLET ORAL DAILY
Status: DISCONTINUED | OUTPATIENT
Start: 2025-07-31 | End: 2025-08-01 | Stop reason: HOSPADM

## 2025-07-30 RX ORDER — TRANEXAMIC ACID 10 MG/ML
1000 INJECTION, SOLUTION INTRAVENOUS EVERY 30 MIN PRN
Status: DISCONTINUED | OUTPATIENT
Start: 2025-07-30 | End: 2025-08-01 | Stop reason: HOSPADM

## 2025-07-30 RX ORDER — MAGNESIUM SULFATE HEPTAHYDRATE 40 MG/ML
4 INJECTION, SOLUTION INTRAVENOUS ONCE
Status: COMPLETED | OUTPATIENT
Start: 2025-07-30 | End: 2025-07-30

## 2025-07-30 RX ORDER — HYDROCODONE BITARTRATE AND ACETAMINOPHEN 5; 325 MG/1; MG/1
1 TABLET ORAL EVERY 4 HOURS PRN
Status: DISCONTINUED | OUTPATIENT
Start: 2025-07-30 | End: 2025-08-01 | Stop reason: HOSPADM

## 2025-07-30 RX ORDER — TRANEXAMIC ACID 10 MG/ML
1000 INJECTION, SOLUTION INTRAVENOUS EVERY 30 MIN PRN
Status: DISCONTINUED | OUTPATIENT
Start: 2025-07-30 | End: 2025-07-30

## 2025-07-30 RX ORDER — IBUPROFEN 600 MG/1
600 TABLET, FILM COATED ORAL EVERY 6 HOURS
Status: DISCONTINUED | OUTPATIENT
Start: 2025-07-30 | End: 2025-08-01 | Stop reason: HOSPADM

## 2025-07-30 RX ORDER — CALCIUM GLUCONATE 98 MG/ML
1 INJECTION, SOLUTION INTRAVENOUS
Status: DISCONTINUED | OUTPATIENT
Start: 2025-07-30 | End: 2025-08-01 | Stop reason: HOSPADM

## 2025-07-30 RX ORDER — ACETAMINOPHEN 325 MG/1
650 TABLET ORAL EVERY 6 HOURS PRN
Status: DISCONTINUED | OUTPATIENT
Start: 2025-07-30 | End: 2025-08-01 | Stop reason: HOSPADM

## 2025-07-30 RX ORDER — OXYTOCIN 10 [USP'U]/ML
10 INJECTION, SOLUTION INTRAMUSCULAR; INTRAVENOUS ONCE AS NEEDED
Status: DISCONTINUED | OUTPATIENT
Start: 2025-07-30 | End: 2025-08-01 | Stop reason: HOSPADM

## 2025-07-30 RX ORDER — METHYLERGONOVINE MALEATE 0.2 MG/ML
200 INJECTION INTRAVENOUS ONCE AS NEEDED
Status: DISCONTINUED | OUTPATIENT
Start: 2025-07-30 | End: 2025-08-01 | Stop reason: HOSPADM

## 2025-07-30 RX ORDER — DIPHENHYDRAMINE HCL 25 MG
25 CAPSULE ORAL EVERY 4 HOURS PRN
Status: DISCONTINUED | OUTPATIENT
Start: 2025-07-30 | End: 2025-08-01 | Stop reason: HOSPADM

## 2025-07-30 RX ORDER — DOCUSATE SODIUM 100 MG/1
200 CAPSULE, LIQUID FILLED ORAL 2 TIMES DAILY PRN
Status: DISCONTINUED | OUTPATIENT
Start: 2025-07-30 | End: 2025-08-01 | Stop reason: HOSPADM

## 2025-07-30 RX ORDER — SIMETHICONE 80 MG
1 TABLET,CHEWABLE ORAL EVERY 6 HOURS PRN
Status: DISCONTINUED | OUTPATIENT
Start: 2025-07-30 | End: 2025-08-01 | Stop reason: HOSPADM

## 2025-07-30 RX ORDER — CARBOPROST TROMETHAMINE 250 UG/ML
250 INJECTION, SOLUTION INTRAMUSCULAR
Status: DISCONTINUED | OUTPATIENT
Start: 2025-07-30 | End: 2025-08-01 | Stop reason: HOSPADM

## 2025-07-30 RX ORDER — OXYTOCIN-SODIUM CHLORIDE 0.9% IV SOLN 30 UNIT/500ML 30-0.9/5 UT/ML-%
95 SOLUTION INTRAVENOUS CONTINUOUS PRN
Status: DISCONTINUED | OUTPATIENT
Start: 2025-07-30 | End: 2025-07-30

## 2025-07-30 RX ORDER — OXYTOCIN-SODIUM CHLORIDE 0.9% IV SOLN 30 UNIT/500ML 30-0.9/5 UT/ML-%
10 SOLUTION INTRAVENOUS ONCE AS NEEDED
Status: COMPLETED | OUTPATIENT
Start: 2025-07-30 | End: 2025-07-30

## 2025-07-30 RX ORDER — MAGNESIUM SULFATE HEPTAHYDRATE 40 MG/ML
INJECTION, SOLUTION INTRAVENOUS
Status: COMPLETED
Start: 2025-07-30 | End: 2025-07-30

## 2025-07-30 RX ORDER — SODIUM CHLORIDE, SODIUM LACTATE, POTASSIUM CHLORIDE, CALCIUM CHLORIDE 600; 310; 30; 20 MG/100ML; MG/100ML; MG/100ML; MG/100ML
INJECTION, SOLUTION INTRAVENOUS CONTINUOUS
Status: DISCONTINUED | OUTPATIENT
Start: 2025-07-30 | End: 2025-07-31

## 2025-07-30 RX ORDER — DIPHENHYDRAMINE HYDROCHLORIDE 50 MG/ML
25 INJECTION, SOLUTION INTRAMUSCULAR; INTRAVENOUS EVERY 4 HOURS PRN
Status: DISCONTINUED | OUTPATIENT
Start: 2025-07-30 | End: 2025-08-01 | Stop reason: HOSPADM

## 2025-07-30 RX ADMIN — OXYTOCIN-SODIUM CHLORIDE 0.9% IV SOLN 30 UNIT/500ML 10 UNITS: 30-0.9/5 SOLUTION at 05:07

## 2025-07-30 RX ADMIN — MAGNESIUM SULFATE HEPTAHYDRATE 4 G: 40 INJECTION, SOLUTION INTRAVENOUS at 07:07

## 2025-07-30 RX ADMIN — Medication 95 MILLI-UNITS/MIN: at 05:07

## 2025-07-30 RX ADMIN — LABETALOL HYDROCHLORIDE 20 MG: 5 INJECTION, SOLUTION INTRAVENOUS at 07:07

## 2025-07-30 RX ADMIN — ZIDOVUDINE 1 MG/KG/HR: 10 INJECTION, SOLUTION INTRAVENOUS at 01:07

## 2025-07-30 RX ADMIN — FENTANYL CITRATE 8 ML/HR: 50 INJECTION INTRAMUSCULAR; INTRAVENOUS at 12:07

## 2025-07-30 RX ADMIN — ZIDOVUDINE 1 MG/KG/HR: 10 INJECTION, SOLUTION INTRAVENOUS at 07:07

## 2025-07-30 RX ADMIN — DOCUSATE SODIUM 100 MG: 100 CAPSULE, LIQUID FILLED ORAL at 11:07

## 2025-07-30 RX ADMIN — DOLUTEGRAVIR SODIUM 50 MG: 50 TABLET, FILM COATED ORAL at 11:07

## 2025-07-30 RX ADMIN — AMPICILLIN SODIUM 2 G: 2 INJECTION, POWDER, FOR SOLUTION INTRAMUSCULAR; INTRAVENOUS at 06:07

## 2025-07-30 RX ADMIN — SODIUM CHLORIDE, POTASSIUM CHLORIDE, SODIUM LACTATE AND CALCIUM CHLORIDE: 600; 310; 30; 20 INJECTION, SOLUTION INTRAVENOUS at 09:07

## 2025-07-30 RX ADMIN — LABETALOL HYDROCHLORIDE 20 MG: 5 INJECTION INTRAVENOUS at 07:07

## 2025-07-30 RX ADMIN — LAMIVUDINE 300 MG: 150 TABLET, FILM COATED ORAL at 11:07

## 2025-07-30 RX ADMIN — IBUPROFEN 600 MG: 600 TABLET ORAL at 09:07

## 2025-07-30 RX ADMIN — ACETAMINOPHEN 650 MG: 325 TABLET ORAL at 09:07

## 2025-07-30 RX ADMIN — GENTAMICIN SULFATE 387.6 MG: 40 INJECTION, SOLUTION INTRAMUSCULAR; INTRAVENOUS at 09:07

## 2025-07-30 RX ADMIN — MAGNESIUM SULFATE HEPTAHYDRATE 2 G/HR: 40 INJECTION, SOLUTION INTRAVENOUS at 07:07

## 2025-07-30 RX ADMIN — ZIDOVUDINE 1 MG/KG/HR: 10 INJECTION, SOLUTION INTRAVENOUS at 12:07

## 2025-07-30 NOTE — PROGRESS NOTES
"LABOR NOTE    S:  Complaints: No.  Epidural Working:  yes  Resident to bedside for routine check.     O: /74   Pulse 76   Temp 98.1 °F (36.7 °C) (Oral)   Resp 18   Ht 5' 4" (1.626 m)   Wt 111.8 kg (246 lb 7.6 oz)   LMP  (LMP Unknown) Comment: pt has nexplanon  SpO2 98%   Breastfeeding No   BMI 42.31 kg/m²     FHT: Baseline 125 mod variability, + accels, - decels, Cat 1 (reassuring)  CTX: q4-5  SVE: 4/60/-2    TIMELINE:   1615: 2/50/-3  1745: 3/60/-2  2145: 4/60/-2, pit at 20    PLAN:    IOL  Continue Close Maternal/Fetal Monitoring  Pitocin Augmentation per protocol  Recheck 4 hours or PRN    Parul Cooper MD  OBGYN PGY-2    "

## 2025-07-30 NOTE — PROGRESS NOTES
"LABOR NOTE    S:  Complaints: No.  Epidural Working:  yes  Resident to bedside for routine check.     O: /72   Pulse 65   Temp 98.5 °F (36.9 °C)   Resp 16   Ht 5' 4" (1.626 m)   Wt 111.8 kg (246 lb 7.6 oz)   LMP  (LMP Unknown) Comment: pt has nexplanon  SpO2 99%   Breastfeeding No   BMI 42.31 kg/m²     FHT: Baseline 130 mod variability, + accels, - decels, Cat 1 (reassuring)  CTX: q4-5 min  SVE: 5/70/-2    TIMELINE:   1615: 2/50/-3  1745: 3/60/-2  2145: 4/60/-2, pit at 20  0230: 5/60/-3  0800: 5/60/-3, pit at 32  0820: 5/60/-2, AROM scant fluid   1020: 5-6/70/-2, AROM Forebag clear fluid  1230: 5/70/-2, pit at 28       PLAN:    IOL  Continue Close Maternal/Fetal Monitoring  Recheck 4 hours or PRN    Lorena Mcdonnell MD  Obstetrics and Gynecology  [facility]        "

## 2025-07-30 NOTE — L&D DELIVERY NOTE
"[facility] - Labor & Delivery  Vaginal Delivery   Operative Note    SUMMARY     Normal spontaneous vaginal delivery of live infant, was placed on mothers abdomen for skin to skin and bulb suctioning performed.   Infant delivered position OA over intact perineum.  Nuchal cord: Yes, cord reduced following delivery.    Spontaneous delivery of placenta and IV pitocin given noting good uterine tone.  No lacerations noted.  Patient tolerated delivery well. Sponge needle and lap counted correctly x2.  EBL 100cc    Indications:  (spontaneous vaginal delivery)  Pregnancy complicated by: Problem List[1]  Admitting GA: 37w3d    Delivery Information for Katelyn Dye    Birth information:  YOB: 2024   Time of birth: 5:21 PM   Sex: female   Head Delivery Date/Time:     Delivery type:    Gestational Age: 37w3d       Delivery Providers    Delivering clinician:            Measurements    Weight: 3110 g  Weight (lbs): 6 lb 13.7 oz  Length: 49.5 cm  Length (in): 19.5"  Head circumference: 34 cm  Chest circumference: 32 cm         Apgars    Living status: Living  Apgar Component Scores:  1 min.:  5 min.:  10 min.:  15 min.:  20 min.:    Skin color:  0  1       Heart rate:  2  2       Reflex irritability:  2  2       Muscle tone:  2  2       Respiratory effort:  2  2       Total:  8  9       Apgars assigned by: JYOTSNA NARVAEZ                         Interventions/Resuscitation    Method: Bulb Suctioning, CPAP, Deep Suctioning, Tactile Stimulation       Cord    No data filed       Placenta    Placenta delivery date/time:   Placenta removal:            Labor Events:       labor: No     Labor Onset Date/Time:         Dilation Complete Date/Time:         Start Pushing Date/Time:         Start Pushing Date/Time:       Rupture Date/Time: 25 0819        Rupture type: ARM (Artificial Rupture)        Fluid Amount:       Fluid Color: Clear              steroids:       Antibiotics given for GBS:     "   Induction: oxytocin     Indications for induction:  Hypertension     Augmentation:       Indications for augmentation:       Labor complications:       Additional complications:          Cervical ripening:                     Delivery:      Episiotomy:       Indication for Episiotomy:       Perineal Lacerations:   Repaired:      Periurethral Laceration:   Repaired:     Labial Laceration:   Repaired:     Sulcus Laceration:   Repaired:     Vaginal Laceration:   Repaired:     Cervical Laceration:   Repaired:     Repair suture:       Repair # of packets:       Last Value - EBL - Nursing (mL):       Sum - EBL - Nursing (mL): 0     Last Value - EBL - Anesthesia (mL):      Calculated QBL (mL): 100     Running total QBL (mL): 100     Vaginal Sweep Performed:       Surgicount Correct:       Vaginal Packing:   Quantity:       Other providers:            Details (if applicable):  Trial of Labor      Categorization:      Priority:     Indications for :     Incision Type:       Additional  information:  Forceps:    Vacuum:    Breech:    Observed anomalies    Other (Comments):         Lorena Mcdonnell MD  Obstetrics & Gynecology, PGY-1       [1]   Patient Active Problem List  Diagnosis    HIV infection    Seizure    Obesity, Class I, BMI 30-34.9    HIV disease affecting pregnancy in third trimester    Iron deficiency anemia    Gestational hypertension, third trimester     (spontaneous vaginal delivery)

## 2025-07-30 NOTE — PROGRESS NOTES
"LABOR NOTE    S:  Complaints: No.  Epidural Working:  yes  Resident to bedside for routine check.     O: /73   Pulse 84   Temp 98.4 °F (36.9 °C)   Resp 17   Ht 5' 4" (1.626 m)   Wt 111.8 kg (246 lb 7.6 oz)   LMP  (LMP Unknown) Comment: pt has nexplanon  SpO2 98%   Breastfeeding No   BMI 42.31 kg/m²     FHT: Baseline 140 mod variability, + accels, - decels, Cat 1 (reassuring)  CTX: q3-5 min  SVE: 5/60/-3    TIMELINE:   1615: 2/50/-3  1745: 3/60/-2  2145: 4/60/-2, pit at 20  0230: 5/60/-3  0800: 5/60/-3, pit at 32  0820: 5/60/-2, AROM scant fluid,       PLAN:    IOL  Continue Close Maternal/Fetal Monitoring  Pitocin Augmentation per protocol, will adjust max to 40  Recheck 4 hours or PRN    Lizy Dodd MD PGY-4  Obstetrics and Gynecology  [facility]        "

## 2025-07-30 NOTE — PROGRESS NOTES
"LABOR NOTE    S:  Complaints: No.  Epidural Working:  yes  Resident to bedside for routine check.     O: /72   Pulse 65   Temp 98.4 °F (36.9 °C)   Resp 17   Ht 5' 4" (1.626 m)   Wt 111.8 kg (246 lb 7.6 oz)   LMP  (LMP Unknown) Comment: pt has nexplanon  SpO2 98%   Breastfeeding No   BMI 42.31 kg/m²     FHT: Baseline 140 mod variability, + accels, - decels, Cat 1 (reassuring)  CTX: q3-5 min  SVE: 5/60/-3    TIMELINE:   1615: 2/50/-3  1745: 3/60/-2  2145: 4/60/-2, pit at 20  0230: 5/60/-3  0800: 5/60/-3, pit at 32      PLAN:    IOL  Continue Close Maternal/Fetal Monitoring  Pitocin Augmentation per protocol  Recheck 4 hours or PRN    Lorena Mcdonnell MD  Obstetrics & Gynecology, PGY-1      "

## 2025-07-30 NOTE — PROGRESS NOTES
"LABOR NOTE    S:  Complaints: No.  Epidural Working:  yes  Resident to bedside for routine check.     O: BP (!) 141/79   Pulse 66   Temp 98.4 °F (36.9 °C)   Resp 16   Ht 5' 4" (1.626 m)   Wt 111.8 kg (246 lb 7.6 oz)   LMP  (LMP Unknown) Comment: pt has nexplanon  SpO2 96%   Breastfeeding No   BMI 42.31 kg/m²     FHT: Baseline 140 mod variability, + accels, - decels, Cat 1 (reassuring)  CTX: q3-5 min  SVE: 5/60/-3    TIMELINE:   1615: 2/50/-3  1745: 3/60/-2  2145: 4/60/-2, pit at 20  0230: 5/60/-3  0800: 5/60/-3, pit at 32  0820: 5/60/-2, AROM scant fluid,   1020: 5-6/70/-2, AROM Forebag clear fluid       PLAN:    IOL  Continue Close Maternal/Fetal Monitoring  Patient has been at pit of 40. Will pit break and restart at half  Recheck 4 hours or PRN    Lizy Dodd MD PGY-4  Obstetrics and Gynecology  [facility]        "

## 2025-07-31 LAB
ABSOLUTE EOSINOPHIL (OHS): 0.03 K/UL
ABSOLUTE MONOCYTE (OHS): 0.96 K/UL (ref 0.3–1)
ABSOLUTE NEUTROPHIL COUNT (OHS): 9.22 K/UL (ref 1.8–7.7)
BASOPHILS # BLD AUTO: 0.03 K/UL
BASOPHILS NFR BLD AUTO: 0.2 %
ERYTHROCYTE [DISTWIDTH] IN BLOOD BY AUTOMATED COUNT: 15.4 % (ref 11.5–14.5)
HCT VFR BLD AUTO: 23.2 % (ref 37–48.5)
HGB BLD-MCNC: 7.3 GM/DL (ref 12–16)
IMM GRANULOCYTES # BLD AUTO: 0.04 K/UL (ref 0–0.04)
IMM GRANULOCYTES NFR BLD AUTO: 0.3 % (ref 0–0.5)
LYMPHOCYTES # BLD AUTO: 1.74 K/UL (ref 1–4.8)
MCH RBC QN AUTO: 26.1 PG (ref 27–31)
MCHC RBC AUTO-ENTMCNC: 31.5 G/DL (ref 32–36)
MCV RBC AUTO: 83 FL (ref 82–98)
NUCLEATED RBC (/100WBC) (OHS): 0 /100 WBC
PLATELET # BLD AUTO: 231 K/UL (ref 150–450)
PMV BLD AUTO: 10.7 FL (ref 9.2–12.9)
RBC # BLD AUTO: 2.8 M/UL (ref 4–5.4)
RELATIVE EOSINOPHIL (OHS): 0.2 %
RELATIVE LYMPHOCYTE (OHS): 14.5 % (ref 18–48)
RELATIVE MONOCYTE (OHS): 8 % (ref 4–15)
RELATIVE NEUTROPHIL (OHS): 76.8 % (ref 38–73)
WBC # BLD AUTO: 12.02 K/UL (ref 3.9–12.7)

## 2025-07-31 PROCEDURE — 25000003 PHARM REV CODE 250

## 2025-07-31 PROCEDURE — 72100003 HC LABOR CARE, EA. ADDL. 8 HRS

## 2025-07-31 PROCEDURE — 36415 COLL VENOUS BLD VENIPUNCTURE: CPT | Performed by: OBSTETRICS & GYNECOLOGY

## 2025-07-31 PROCEDURE — 11000001 HC ACUTE MED/SURG PRIVATE ROOM

## 2025-07-31 PROCEDURE — 63600175 PHARM REV CODE 636 W HCPCS

## 2025-07-31 PROCEDURE — 99232 SBSQ HOSP IP/OBS MODERATE 35: CPT | Mod: ,,, | Performed by: OBSTETRICS & GYNECOLOGY

## 2025-07-31 PROCEDURE — 85025 COMPLETE CBC W/AUTO DIFF WBC: CPT | Performed by: OBSTETRICS & GYNECOLOGY

## 2025-07-31 RX ORDER — NIFEDIPINE 30 MG/1
30 TABLET, EXTENDED RELEASE ORAL NIGHTLY
Status: DISCONTINUED | OUTPATIENT
Start: 2025-07-31 | End: 2025-08-01 | Stop reason: HOSPADM

## 2025-07-31 RX ORDER — LIDOCAINE HYDROCHLORIDE 10 MG/ML
5 INJECTION, SOLUTION INFILTRATION; PERINEURAL ONCE
Status: COMPLETED | OUTPATIENT
Start: 2025-07-31 | End: 2025-08-01

## 2025-07-31 RX ADMIN — HYDROCODONE BITARTRATE AND ACETAMINOPHEN 1 TABLET: 10; 325 TABLET ORAL at 04:07

## 2025-07-31 RX ADMIN — MAGNESIUM SULFATE HEPTAHYDRATE 2 G/HR: 40 INJECTION, SOLUTION INTRAVENOUS at 02:07

## 2025-07-31 RX ADMIN — IBUPROFEN 600 MG: 600 TABLET ORAL at 03:07

## 2025-07-31 RX ADMIN — DOLUTEGRAVIR SODIUM 50 MG: 50 TABLET, FILM COATED ORAL at 10:07

## 2025-07-31 RX ADMIN — LAMIVUDINE 300 MG: 150 TABLET, FILM COATED ORAL at 10:07

## 2025-07-31 RX ADMIN — ACETAMINOPHEN 650 MG: 325 TABLET ORAL at 10:07

## 2025-07-31 RX ADMIN — PRENATAL VIT W/ FE FUMARATE-FA TAB 27-0.8 MG 1 TABLET: 27-0.8 TAB at 10:07

## 2025-07-31 RX ADMIN — DOCUSATE SODIUM 200 MG: 100 CAPSULE, LIQUID FILLED ORAL at 10:07

## 2025-07-31 RX ADMIN — IBUPROFEN 600 MG: 600 TABLET ORAL at 10:07

## 2025-07-31 RX ADMIN — NIFEDIPINE 30 MG: 30 TABLET, FILM COATED, EXTENDED RELEASE ORAL at 09:07

## 2025-07-31 RX ADMIN — ENOXAPARIN SODIUM 40 MG: 40 INJECTION SUBCUTANEOUS at 10:07

## 2025-07-31 RX ADMIN — DOCUSATE SODIUM 200 MG: 100 CAPSULE, LIQUID FILLED ORAL at 09:07

## 2025-07-31 RX ADMIN — SODIUM CHLORIDE, POTASSIUM CHLORIDE, SODIUM LACTATE AND CALCIUM CHLORIDE: 600; 310; 30; 20 INJECTION, SOLUTION INTRAVENOUS at 03:07

## 2025-07-31 RX ADMIN — ACETAMINOPHEN 650 MG: 325 TABLET ORAL at 03:07

## 2025-07-31 RX ADMIN — ENOXAPARIN SODIUM 40 MG: 40 INJECTION SUBCUTANEOUS at 09:07

## 2025-07-31 NOTE — ANESTHESIA POSTPROCEDURE EVALUATION
Anesthesia Post Evaluation    Patient: Yvrose Dye    Procedure(s) Performed: * No procedures listed *    Final Anesthesia Type: epidural      Patient location during evaluation: labor & delivery  Patient participation: Yes- Able to Participate  Level of consciousness: awake and alert and oriented  Post-procedure vital signs: reviewed and stable  Pain management: adequate  Airway patency: patent  VICKIE mitigation strategies: Use of major conduction anesthesia (spinal/epidural) or peripheral nerve block  PONV status at discharge: No PONV  Anesthetic complications: no      Cardiovascular status: blood pressure returned to baseline  Respiratory status: unassisted  Hydration status: euvolemic  Follow-up not needed.              Vitals Value Taken Time   /63 07/31/25 08:03   Temp 36.6 °C (97.9 °F) 07/31/25 08:00   Pulse 79 07/31/25 08:45   Resp 18 07/31/25 08:00   SpO2 100 % 07/31/25 08:45   Vitals shown include unfiled device data.      No case tracking events are documented in the log.      Pain/Doretha Score: Pain Rating Prior to Med Admin: 8 (7/31/2025  4:10 AM)  Pain Rating Post Med Admin: 4 (7/31/2025  4:40 AM)

## 2025-07-31 NOTE — PLAN OF CARE
Problem: Adult Inpatient Plan of Care  Goal: Plan of Care Review  2025 by Renetta Yin RN  Outcome: Progressing  2025 by Renetta Yin RN  Outcome: Progressing  Goal: Patient-Specific Goal (Individualized)  2025 by Renetta Yin RN  Outcome: Progressing  2025 by Renetta Yin RN  Outcome: Progressing  Goal: Absence of Hospital-Acquired Illness or Injury  2025 by Renetta Yin RN  Outcome: Progressing  2025 by Renetta Yin RN  Outcome: Progressing  Goal: Optimal Comfort and Wellbeing  2025 by Renetta Yin RN  Outcome: Progressing  2025 by Renetta Yin RN  Outcome: Progressing  Goal: Readiness for Transition of Care  2025 by Renetta Yin RN  Outcome: Progressing  2025 by Renetta Yin RN  Outcome: Progressing     Problem: Bariatric Environmental Safety  Goal: Safety Maintained with Care  2025 by Renetta Yin RN  Outcome: Progressing  2025 by Renetta Yin RN  Outcome: Progressing     Problem:  Fall Injury Risk  Goal: Absence of Fall, Infant Drop and Related Injury  2025 by Renetta Yin RN  Outcome: Progressing  2025 by Renetta Yin RN  Outcome: Progressing     Problem: Infection  Goal: Absence of Infection Signs and Symptoms  2025 by Renetta Yin RN  Outcome: Progressing  2025 by Renetta Yin RN  Outcome: Progressing     Problem: Skin Injury Risk Increased  Goal: Skin Health and Integrity  2025 by Renetta Yin RN  Outcome: Progressing  2025 by Renetta Yin RN  Outcome: Progressing     Problem: Postpartum (Vaginal Delivery)  Goal: Successful Parent Role Transition  2025 by Renetta Yin RN  Outcome: Progressing  2025 by Renetta Yin RN  Outcome: Progressing  Goal: Hemostasis  2025 by Renetta Yin RN  Outcome: Progressing  2025 by Renetta Yin RN  Outcome:  Progressing  Goal: Absence of Infection Signs and Symptoms  7/30/2025 2127 by Renetta Yin RN  Outcome: Progressing  7/30/2025 2127 by Renetta Yin RN  Outcome: Progressing  Goal: Anesthesia/Sedation Recovery  7/30/2025 2127 by Renetta Yin RN  Outcome: Progressing  7/30/2025 2127 by Renetta Yin RN  Outcome: Progressing  Goal: Optimal Pain Control and Function  7/30/2025 2127 by Renetta Yin RN  Outcome: Progressing  7/30/2025 2127 by Renetta Yin RN  Outcome: Progressing  Goal: Effective Urinary Elimination  7/30/2025 2127 by Renetta Yin RN  Outcome: Progressing  7/30/2025 2127 by Renetta Yin RN  Outcome: Progressing     Magnesium infusion initiated at 1954 due to sustained severe BPs. VSS otherwise. Questions and concerns answered. Safety measures in place.

## 2025-07-31 NOTE — PROGRESS NOTES
POSTPARTUM PROGRESS NOTE    Subjective:     PPD/POD#: 1   Procedure:    EGA: 37w3d   N/V: Yes, emesis x1   F/C: No   Abd Pain: Mild, well-controlled with oral pain medication   Lochia: moderate   Voiding: Yes   Ambulating: Yes   Bowel fnc: Yes   Contraception: Nexplanon to be placed prior to discharge     Patient with sustained severe range blood pressures, required labetalol 20 x1, met criteria for pre-eclampsia with SF, started on IV Mag overnight.     Objective:      Temp:  [98.2 °F (36.8 °C)-100.9 °F (38.3 °C)] 99.6 °F (37.6 °C)  Pulse:  [] 74  Resp:  [16-18] 18  SpO2:  [92 %-100 %] 100 %  BP: (112-170)/(56-98) 112/56    Abdomen: Soft, appropriately tender   Uterus: Firm, no fundal tenderness   Incision: N/A   Negative for signs and symptoms of magnesium toxicity, reflexes intact     Lab Review  Recent Labs   Lab 25  1619   * 134*   K 3.7 3.7    107   CO2 19* 19*   BUN 8 4*   CREATININE 0.6 0.6   GLU 88 76   PROT 6.6 7.0   BILITOT 0.3 0.3   ALKPHOS 138 161*   ALT <8* <8*   AST 17 20     Recent Labs   Lab 25  1619   WBC 7.66 8.01   HGB 8.3* 8.4*   HCT 25.4* 26.8*   MCV 82 84    249     Recent Labs   Lab 25  1619 25  1751   WBC 7.66 8.01  --    HGB 8.3* 8.4*  --    HCT 25.4* 26.8*  --     249  --    BUN 8 4*  --    CREATININE 0.6 0.6  --    ALT <8* <8*  --    AST 17 20  --    UTPCR  --   --  0.15         I/O    Intake/Output Summary (Last 24 hours) at 2025 0600  Last data filed at 2025 0500  Gross per 24 hour   Intake 4016.27 ml   Output 5250 ml   Net -1233.73 ml        Assessment and Plan:   34yo  PPD#1 s/p      Postpartum care:  - Patient doing well.  - Continue routine management and advances.  - Requesting nexplanon before discharge  - IV antiemetics prn for nausea    Preeclampsia with SF (BP), gHTN  - BP as above  - asymptomatic  - preE labs as above  - UOP: 2.2 cc/kg/hr  - Mag:  continue  - Hypertensive agent none    IAI  - VS as above  - currently afebrile  - no fundal tenderness  - Ampicillin/Gentamicin: status post    HIV  - s/p IV Zidovudine intrapartum  - Continue Dovato daily    CHILO  - H/H as above  - QBL: 50  - asymptomatic  - iron/colace     Obesity:   - PrePreg BMI 40  - Encourage ambulation  - Lovenox: 40 mg BID     H/o Seizure  - Home medications: none    Lorena Mcdonnell MD  Obstetrics & Gynecology, PGY-1

## 2025-07-31 NOTE — PROGRESS NOTES
25 184   [place]  Depression Scale:  In the Past 7 Days   I have been able to laugh and see the funny side of things. 0   I have looked forward with enjoyment to things. 0   I have blamed myself unnecessarily when things went wrong. 2   I have been anxious or worried for no good reason. 2   I have felt scared or panicky for no good reason. 2   Things have been getting on top of me. 2   I have been so unhappy that I have had difficulty sleeping. 2   I have felt sad or miserable. 1   I have been so unhappy that I have been crying. 1   The thought of harming myself has occurred to me. 0   [place]  Depression Scale Total 12     MD Tameka notified of score. To come to bedside to assess. No further changes at this time.

## 2025-07-31 NOTE — CARE UPDATE
Patient had sustained severe range BP, cuff correctly placed and confirmed by RN. Labetalol 20 IV and MgSO4 ordered. To bedside to assess patient. Denies HA, CP, SOB, vision changes, RUQ abdominal pain. Currently feeling well. Discussed new diagnosis of preeclampsia w/ severe features (BP). Discussed treatment of BP with acute anti-HTN medication. Discussed recommendation for magnesium sulfate for seizure prophylaxis. Discussed risk of maternal seizures and patient agrees to magnesium. Will continue intrapartum and for 24hrs postpartum. Discussed side effects with patient. Will continue to monitor BP closely.

## 2025-07-31 NOTE — NURSING
The following message was sent to dr messina's staff:  Hi, can you please call ms salas to schedule post partum blood pressure check appt on Monday August 4th, pt was dx w/preE w/SF and does NOT have a bp cuff at home. Thank you

## 2025-07-31 NOTE — PLAN OF CARE
Copied from baby's chart      Assessment was completed at bedside with FIDELIA, she's alert and oriented with no communication barriers. FIDELIA is being seen by NP-Isi Richard for her HIV status. FIDELIA has all the essential items that the baby needs. FIDELIA stated that a family member will bring the car seat to the hospital at discharge. FIDELIA stated that she has good family support. FIDELIA stated that she didn't need any resources at this time.  encouraged FIDELIA to let the nurse know if she needed anything from the sw.     25 2179   Pediatric Discharge Planning Assessment   Assessment Type Discharge Planning Assessment   Source of Information family   Verified Demographic and Insurance Information Yes   Insurance Medicaid   Medicaid [employer]   Medicaid Insurance Primary   Lives With mother;father;brother;sister   Number people in home 6 including infant   Relationship Status In relationship   Other children (include names and ages) Emilia-15   Evaristo-11  Journi-10  Diary-Infant   School/ home with parent   Family Involvement High   Hearing Difficulty or Deaf no   Visual Difficulty or Blind no   Difficulty Concentrating, Remembering or Making Decisions no   Communication Difficulty no   Eating/Swallowing Difficulty no   Do you currently have service(s) that help you manage your care at home? No   DCFS No indications (Indicators for Report)   Discharge Plan A Home with family   Discharge Plan B Home   Equipment Currently Used at Home none   DME Needed Upon Discharge  none   Do you have any problems affording any of your prescribed medications? No       Discharge Planning Assessment    Do you have a car seat? Yes  Do you have a bassinet/pack-n-play? Yes  Do you have bottles? Yes  Do you have wipes?  Yes  Do you have diapers?  Yes  Do you have clothes?  Yes  How are you getting home?  Family  How will the baby get to pediatric visits?  Drive  Have you selected a pediatrician?  [facility] [id number], [place] [id number]   Nutritional Plan:  Do you have WIC? Yes  Do you have SNAP? No

## 2025-07-31 NOTE — NURSING TRANSFER
Nursing Transfer Note      7/30/2025 2105    Nurse giving handoff: EDINSON Jackson  Nurse receiving handoff: MELISA Combs RN    Transfer To: [facility] Rm 645    Transfer via: wheelchair    Transfer with: pt held infant while sitting in wheelchair    Transported by: EDINSON Martines    Transfer Vital Signs: VSS  Pt denies chest pain, SOB, n/v,     Telemetry: {TELEMETRY:45607}  Order for Tele Monitor? {YES/NO:20267}    Additional Lines: {Additional Lines:21508}    Medicines sent: ***    Any special needs or follow-up needed: ***    Patient belongings transferred with patient: {YES/NO:20267}    Chart send with patient: {YES (DEF)/NO:62561}    Notified: {NOTIFIED:40786}    Patient reassessed at: *** (date, time)  1  Upon arrival to floor: {IP NSG TRANSFER ARRIVAL OHS:57866}

## 2025-08-01 ENCOUNTER — TELEPHONE (OUTPATIENT)
Dept: OBSTETRICS AND GYNECOLOGY | Facility: CLINIC | Age: 35
End: 2025-08-01
Payer: COMMERCIAL

## 2025-08-01 VITALS
OXYGEN SATURATION: 98 % | TEMPERATURE: 98 F | WEIGHT: 246.5 LBS | DIASTOLIC BLOOD PRESSURE: 61 MMHG | BODY MASS INDEX: 42.08 KG/M2 | HEIGHT: 64 IN | HEART RATE: 70 BPM | RESPIRATION RATE: 18 BRPM | SYSTOLIC BLOOD PRESSURE: 120 MMHG

## 2025-08-01 PROCEDURE — 0JHF3HZ INSERTION OF CONTRACEPTIVE DEVICE INTO LEFT UPPER ARM SUBCUTANEOUS TISSUE AND FASCIA, PERCUTANEOUS APPROACH: ICD-10-PCS | Performed by: OBSTETRICS & GYNECOLOGY

## 2025-08-01 PROCEDURE — 63600175 PHARM REV CODE 636 W HCPCS

## 2025-08-01 PROCEDURE — 11981 INSERTION DRUG DLVR IMPLANT: CPT | Mod: ,,, | Performed by: OBSTETRICS & GYNECOLOGY

## 2025-08-01 PROCEDURE — 25000003 PHARM REV CODE 250

## 2025-08-01 PROCEDURE — 11981 INSERTION DRUG DLVR IMPLANT: CPT

## 2025-08-01 RX ORDER — DOCUSATE SODIUM 100 MG/1
100 CAPSULE, LIQUID FILLED ORAL 2 TIMES DAILY
Qty: 30 CAPSULE | Refills: 1 | Status: SHIPPED | OUTPATIENT
Start: 2025-08-01

## 2025-08-01 RX ORDER — IBUPROFEN 600 MG/1
600 TABLET, FILM COATED ORAL EVERY 6 HOURS PRN
Qty: 30 TABLET | Refills: 1 | Status: SHIPPED | OUTPATIENT
Start: 2025-08-01

## 2025-08-01 RX ORDER — DEXTROMETHORPHAN HYDROBROMIDE, GUAIFENESIN 5; 100 MG/5ML; MG/5ML
650 LIQUID ORAL EVERY 8 HOURS
Qty: 30 TABLET | Refills: 0 | Status: SHIPPED | OUTPATIENT
Start: 2025-08-01

## 2025-08-01 RX ORDER — NIFEDIPINE 30 MG/1
30 TABLET, EXTENDED RELEASE ORAL NIGHTLY
Qty: 30 TABLET | Refills: 11 | Status: SHIPPED | OUTPATIENT
Start: 2025-08-01 | End: 2026-08-01

## 2025-08-01 RX ADMIN — DOCUSATE SODIUM 200 MG: 100 CAPSULE, LIQUID FILLED ORAL at 08:08

## 2025-08-01 RX ADMIN — ETONOGESTREL 68 MG: 68 IMPLANT SUBCUTANEOUS at 08:08

## 2025-08-01 RX ADMIN — PRENATAL VIT W/ FE FUMARATE-FA TAB 27-0.8 MG 1 TABLET: 27-0.8 TAB at 08:08

## 2025-08-01 RX ADMIN — LIDOCAINE HYDROCHLORIDE 5 ML: 10 INJECTION, SOLUTION INFILTRATION; PERINEURAL at 08:08

## 2025-08-01 RX ADMIN — ACETAMINOPHEN 650 MG: 325 TABLET ORAL at 08:08

## 2025-08-01 RX ADMIN — ENOXAPARIN SODIUM 40 MG: 40 INJECTION SUBCUTANEOUS at 08:08

## 2025-08-01 RX ADMIN — DOLUTEGRAVIR SODIUM 50 MG: 50 TABLET, FILM COATED ORAL at 08:08

## 2025-08-01 RX ADMIN — IBUPROFEN 600 MG: 600 TABLET ORAL at 08:08

## 2025-08-01 RX ADMIN — LAMIVUDINE 300 MG: 150 TABLET, FILM COATED ORAL at 08:08

## 2025-08-01 NOTE — PROCEDURES
NEXPLANON INSERTION    Patient was counseled on the risks of the procedure including pain, bleeding, and infection.  Patient acknowledged risks, and all questions were answered.    Insertion site was prepped with povidine iodine and injected with 4-cc of lidocaine.  Implant was inserted with the 's provided device.  Patient palpated the device.  Site bandaged and wrapped.  Patient instructed to keep bandage on for 24 hours.    Arm:  Left  Lot #:  [id number]  Exp. Date: 2027-06    Pain, bleeding, and infection precautions given.    Brie English M.D.  Obstetrics and Gynecology  PGY-1

## 2025-08-01 NOTE — DISCHARGE SUMMARY
Delivery Discharge Summary  Obstetrics      Primary OB Clinician: No att. providers found     Admission date: 2025  Discharge date: 2025    Disposition: To home, self care    Discharge Diagnosis List:Problem List[1]    Procedure:      Hospital Course:  Yvrose Dye is a 35 y.o. now , PPD #2 who was admitted on 2025 at 37w2d for induction of lbaor. IUP complicated by HIV, gHTN, CHILO, and obesity. Patient was subsequently admitted to labor and delivery unit with signed consents.     Patient was started on IV zidovudine intrapartum for HIV therapy as well as maintained on home Dovato. Labor course was complicated by an IAI with a Tmax of 100.9 requiring amp/gent. Labor resulted in  without complications. Please see delivery note for further details.     Her postpartum course was complicated by sustained SRBPs on PPD 1 requiring push of IV labetalol 20 one time. She therefore met criteria for pre-E WSF (BP) and was started on IV Mag for a total of 24h. Her preE labs and UOP were reassuring. She continued to have intermittent MRBP and was started on Procardia 30XL. From an IAI standpoint, she remained afebrile without fundal tenderness in the postpartum period.     On discharge day, patient's pain is controlled with oral pain medications. Pt is tolerating ambulation without SOB or CP, and regular diet without N/V. Reports lochia is mild. Denies any HA, vision changes, F/C, LE swelling. Denies any breast pain/soreness.    Pt in stable condition and ready for discharge. She has been instructed to start and/or continue medications and follow up with her obstetrics provider as listed below.    Pertinent studies:  CBC  Recent Labs   Lab 25  0132 25  1619 25  0714   WBC 7.66 8.01 12.02   HGB 8.3* 8.4* 7.3*   HCT 25.4* 26.8* 23.2*   MCV 82 84 83    249 231        Immunization History   Administered Date(s) Administered    Rsv, Bivalent, Rsvpref (Abrysvo) 2025    Tdap  "2025        Delivery:    Episiotomy: None   Lacerations: None   Repair suture: None   Repair # of packets:     Blood loss (ml):       Birth information:  YOB: 2024   Time of birth: 5:21 PM   Sex: female   Delivery type: Vaginal, Spontaneous   Gestational Age: 37w3d     Measurements    Weight: 3110 g  Weight (lbs): 6 lb 13.7 oz  Length: 49.5 cm  Length (in): 19.5"  Head circumference: 34 cm  Chest circumference: 32 cm         Delivery Clinician: Delivery Providers    Delivering clinician: Shaila Ferro MD   Provider Role    Jo Ann Kapoor RN Registered Nurse    Heather Dumont RN Registered Nurse             Additional  information:  Forceps:    Vacuum:    Breech:    Observed anomalies      Living?:     Apgars    Living status: Living  Apgar Component Scores:  1 min.:  5 min.:  10 min.:  15 min.:  20 min.:    Skin color:  0  1       Heart rate:  2  2       Reflex irritability:  2  2       Muscle tone:  2  2       Respiratory effort:  2  2       Total:  8  9       Apgars assigned by: JACOB DUMONT RNC         Placenta: Delivered:       appearance        2025     6:47 PM   [place]  Depression Scale   I have been able to laugh and see the funny side of things. 0   I have looked forward with enjoyment to things. 0   I have blamed myself unnecessarily when things went wrong. 2   I have been anxious or worried for no good reason. 2   I have felt scared or panicky for no good reason. 2   Things have been getting on top of me. 2   I have been so unhappy that I have had difficulty sleeping. 2   I have felt sad or miserable. 1   I have been so unhappy that I have been crying. 1   The thought of harming myself has occurred to me. 0       Patient Instructions:   Discharge Medication List as of 2025  1:36 PM        START taking these medications    Details   acetaminophen (TYLENOL) 650 MG TbSR Take 1 tablet (650 mg total) by mouth every 8 (eight) hours., Starting 2025, " Normal      ibuprofen (ADVIL,MOTRIN) 600 MG tablet Take 1 tablet (600 mg total) by mouth every 6 (six) hours as needed., Starting Fri 8/1/2025, Normal      NIFEdipine (PROCARDIA-XL) 30 MG (OSM) 24 hr tablet Take 1 tablet (30 mg total) by mouth every evening., Starting Fri 8/1/2025, Until Sat 8/1/2026, Normal           CONTINUE these medications which have CHANGED    Details   docusate sodium (COLACE) 100 MG capsule Take 1 capsule (100 mg total) by mouth 2 (two) times daily., Starting Fri 8/1/2025, Normal           CONTINUE these medications which have NOT CHANGED    Details   dolutegravir-lamivudine (DOVATO)  mg Tab Take 1 tablet by mouth once daily., Starting Wed 11/13/2024, Normal           STOP taking these medications       PNV no.153/FA/om3/dha/epa/fish (PRENATAL GUMMIES ORAL) Comments:   Reason for Stopping:               Discharge Procedure Orders   Diet Adult Regular     Lifting restrictions   Order Comments: No lifting anything larger than baby for 6 weeks     No driving until:   Order Comments: No driving while taking narcotics     Pelvic Rest   Order Comments: Until seen in clinic     Notify your health care provider if you experience any of the following:  temperature >100.4     Notify your health care provider if you experience any of the following:  persistent nausea and vomiting or diarrhea     Notify your health care provider if you experience any of the following:  severe uncontrolled pain     Notify your health care provider if you experience any of the following:  redness, tenderness, or signs of infection (pain, swelling, redness, odor or green/yellow discharge around incision site)     Notify your health care provider if you experience any of the following:  difficulty breathing or increased cough     Notify your health care provider if you experience any of the following:  severe persistent headache     Notify your health care provider if you experience any of the following:  persistent  dizziness, light-headedness, or visual disturbances     Activity as tolerated        Follow-up Information       Shaila Ferro MD Follow up in 1 week(s).    Specialty: Obstetrics and Gynecology  Why: Post partum blood pressure check  Contact information:  [address] [place] [address]  [phone]               Shaila Ferro MD Follow up in 6 week(s).    Specialty: Obstetrics and Gynecology  Why: Post partum check  Contact information:  [address]  [place] [address]  [phone]                            Brie English M.D.  Obstetrics and Gynecology  PGY-1        [1]   Patient Active Problem List  Diagnosis    HIV infection    Seizure    Obesity, Class I, BMI 30-34.9    HIV disease affecting pregnancy in third trimester    Iron deficiency anemia    Gestational hypertension, third trimester     (spontaneous vaginal delivery)

## 2025-08-01 NOTE — PROGRESS NOTES
POSTPARTUM PROGRESS NOTE    Subjective:     PPD/POD#: 2   Procedure:    EGA: 37w3d   N/V: No   F/C: No   Abd Pain: Mild, well-controlled with oral pain medication   Lochia: moderate   Voiding: Yes   Ambulating: Yes   Bowel fnc: Yes   Contraception: Nexplanon to be placed prior to discharge     2x mild range blood pressures overnight, currently on Procardia 30. No complaints, all questions answered    Objective:      Temp:  [97.6 °F (36.4 °C)-98.3 °F (36.8 °C)] 98.3 °F (36.8 °C)  Pulse:  [70-88] 70  Resp:  [17-18] 18  SpO2:  [98 %-100 %] 98 %  BP: (112-146)/(62-83) 112/67    Abdomen: Soft, appropriately tender   Uterus: Firm, no fundal tenderness   Incision: N/A       Lab Review  Recent Labs   Lab 25   * 134*   K 3.7 3.7    107   CO2 19* 19*   BUN 8 4*   CREATININE 0.6 0.6   GLU 88 76   PROT 6.6 7.0   BILITOT 0.3 0.3   ALKPHOS 138 161*   ALT <8* <8*   AST 17 20     Recent Labs   Lab 25  16125  0714   WBC 7.66 8.01 12.02   HGB 8.3* 8.4* 7.3*   HCT 25.4* 26.8* 23.2*   MCV 82 84 83    249 231     Recent Labs   Lab 25  1751 25  0714   WBC 7.66 8.01  --  12.02   HGB 8.3* 8.4*  --  7.3*   HCT 25.4* 26.8*  --  23.2*    249  --  231   BUN 8 4*  --   --    CREATININE 0.6 0.6  --   --    ALT <8* <8*  --   --    AST 17 20  --   --    UTPCR  --   --  0.15  --          I/O    Intake/Output Summary (Last 24 hours) at 2025 0646  Last data filed at 2025  Gross per 24 hour   Intake 1750.15 ml   Output 2150 ml   Net -399.85 ml        Assessment and Plan:   36yo  PPD#2 s/p      Postpartum care:  - Patient doing well.  - Continue routine management and advances.  - Requesting nexplanon before discharge    Preeclampsia with SF (BP), gHTN  - BP as above  - asymptomatic  - preE labs as above  - UOP: 1.6 cc/kg/hr + 2x unmeasured  - Mag: complete  - Hypertensive agent none    IAI  - VS as  above  - currently afebrile  - no fundal tenderness  - Ampicillin/Gentamicin: status post    HIV  - s/p IV Zidovudine intrapartum  - Continue Dovato daily    CHILO  - H/H as above  - QBL: 50  - asymptomatic  - iron/colace     Obesity:   - PrePreg BMI 40  - Encourage ambulation  - Lovenox: 40 mg BID     H/o Seizure  - Home medications: none    Lorena Mcdonnell MD  Obstetrics & Gynecology, PGY-1

## 2025-08-04 ENCOUNTER — PATIENT MESSAGE (OUTPATIENT)
Dept: OBSTETRICS AND GYNECOLOGY | Facility: OTHER | Age: 35
End: 2025-08-04
Payer: COMMERCIAL

## 2025-08-04 LAB
ESTROGEN SERPL-MCNC: NORMAL PG/ML
INSULIN SERPL-ACNC: NORMAL U[IU]/ML
LAB AP DIAGNOSIS CATEGORY: NORMAL
LAB AP GROSS DESCRIPTION: NORMAL
LAB AP PERFORMING LOCATION(S): NORMAL
LAB AP REPORT FOOTNOTES: NORMAL

## 2025-08-13 ENCOUNTER — POSTPARTUM VISIT (OUTPATIENT)
Dept: OBSTETRICS AND GYNECOLOGY | Facility: CLINIC | Age: 35
End: 2025-08-13
Payer: MEDICAID

## 2025-08-13 VITALS
HEIGHT: 64 IN | BODY MASS INDEX: 37.8 KG/M2 | SYSTOLIC BLOOD PRESSURE: 134 MMHG | HEART RATE: 73 BPM | WEIGHT: 221.44 LBS | DIASTOLIC BLOOD PRESSURE: 94 MMHG

## 2025-08-13 PROCEDURE — 99213 OFFICE O/P EST LOW 20 MIN: CPT | Mod: S$PBB,TH,, | Performed by: OBSTETRICS & GYNECOLOGY

## 2025-08-13 PROCEDURE — 99213 OFFICE O/P EST LOW 20 MIN: CPT | Mod: PBBFAC,TH,PN | Performed by: OBSTETRICS & GYNECOLOGY

## 2025-08-13 PROCEDURE — 1111F DSCHRG MED/CURRENT MED MERGE: CPT | Mod: CPTII,,, | Performed by: OBSTETRICS & GYNECOLOGY

## 2025-08-13 PROCEDURE — 99999 PR PBB SHADOW E&M-EST. PATIENT-LVL III: CPT | Mod: PBBFAC,,, | Performed by: OBSTETRICS & GYNECOLOGY
